# Patient Record
Sex: FEMALE | Race: WHITE
[De-identification: names, ages, dates, MRNs, and addresses within clinical notes are randomized per-mention and may not be internally consistent; named-entity substitution may affect disease eponyms.]

---

## 2017-05-18 ENCOUNTER — HOSPITAL ENCOUNTER (EMERGENCY)
Dept: HOSPITAL 80 - FED | Age: 82
Discharge: HOME | End: 2017-05-18
Payer: COMMERCIAL

## 2017-05-18 VITALS
TEMPERATURE: 98.6 F | HEART RATE: 60 BPM | DIASTOLIC BLOOD PRESSURE: 35 MMHG | RESPIRATION RATE: 16 BRPM | SYSTOLIC BLOOD PRESSURE: 115 MMHG

## 2017-05-18 VITALS — OXYGEN SATURATION: 93 %

## 2017-05-18 DIAGNOSIS — I25.10: ICD-10-CM
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
## 2017-05-18 DIAGNOSIS — I10: ICD-10-CM

## 2017-05-18 DIAGNOSIS — Z79.82: ICD-10-CM

## 2017-05-18 DIAGNOSIS — S00.83XA: Primary | ICD-10-CM

## 2017-05-18 DIAGNOSIS — I25.2: ICD-10-CM

## 2017-05-18 DIAGNOSIS — Z95.5: ICD-10-CM
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
## 2017-05-18 NOTE — EDPHY
H & P


Stated Complaint: walked into wall last night contusion r eye/on plavix


HPI/ROS: 





HPI





CHIEF COMPLAINT:  Head injury, on blood thinners





HISTORY OF PRESENT ILLNESS:  This patient very pleasant 86-year-old, 

significant past medical history for coronary artery disease with stent on 

aspirin and Plavix, anemia, dementia, presents emergency room with her daughter 

by private vehicle after she states that she was using her walker and turned 

and hit her head into the wall.  No LOC.  Did not fall.  Has localized 

ecchymosis and swelling to the right side of her face around her right orbit 

but no direct eye trauma.





Past Medical History:  Coronary artery disease, anemia, dementia, hypertension





Past Surgical History:  No recent surgical history, stents cardiac





Social History:  Lives locally, daughter at bedside





Family History:  Noncontributory








ROS   


REVIEW OF SYSTEMS:


A comprehensive 10 point review of systems is otherwise negative aside from 

elements mentioned in the history of present illness.








Exam   


Constitutional   triage nursing summary reviewed, vital signs reviewed, awake/

alert. 


Eyes   normal conjunctivae and sclera, EOMI, PERRLA.  Right eye normal.  No 

significant trauma to the globe or evidence of bleeding but periorbital 

swelling and ecchymosis present.


HENT   head/neck:  Ecchymosis and swelling periorbital right eye, right forehead

, no bony abnormality midface stable, no midline cervical spine pain, hematoma 

right forehead, no laceration normal inspection, atraumatic, moist mucus 

membranes, no epistaxis, neck supple/ no meningismus, no raccoon eyes. 


Respiratory   clear to auscultation bilaterally, normal breath sounds, no 

respiratory distress, no wheezing. 


Cardiovascular   rate normal, regular rhythm, no murmur, no edema, distal 

pulses normal. 


Gastrointestinal   soft, non-tender, no rebound, no guarding, normal bowel 

sounds, no distension, no pulsatile mass. 


Genitourinary   no CVA tenderness. 


Musculoskeletal  no midline vertebral tenderness, full range of motion, no calf 

swelling, no tenderness of extremities, no meningismus, good pulses, 

neurovascularly intact.


Skin   pink, warm, & dry, no rash, skin atraumatic. 


Neurologic   awake, alert and oriented x 3, AAOx3, moves all 4 extremities 

equally, motor intact, sensory intact, CN II-XII intact, normal cerebellar, 

normal vision, normal speech. 


Psychiatric   normal mood/affect. 


Heme/Lymph/Immune   no lymphadenopathy.





Differential Diagnosis:  Includes but not limited to in a particular order, 

closed-head injury, intracranial bleed, skull fracture, subdural, epidural, 

traumatic subarachnoid, facial fractures





Medical Decision Making:  Plan for this patient CT head without contrast for 

trauma given on aspirin and Plavix.  Aspirin has been on hold since last month 

due to anemia.  Still currently taking Plavix due to coronary stents.





Re-evaluation:








CT scan of the head without IV contrast The results of the study are negative 

for acute abnormality specifically no trauma..  The study was read by Dr. Hall.  I viewed the images myself on the PACS system.





1420:  I did update this patient family that the CT scan shows no acute bleed 

or trauma. They would like to be discharged home.  This is reasonable.  

Understand return emergency room if there is any worsening symptoms includes 

headache, vomiting or any questions or concerns.


Source: Patient





- Personal History


Current Tetanus/Diphtheria Vaccine: Yes


Tetanus Vaccine Date: >10 YRS





- Medical/Surgical History


Hx Asthma: No


Hx Chronic Respiratory Disease: No


Hx Diabetes: No


Hx Cardiac Disease: Yes


Hx Renal Disease: No


Hx Cirrhosis: No


Hx Alcoholism: No


Hx HIV/AIDS: No


Hx Splenectomy or Spleen Trauma: No


Other PMH: CAD, angioplasty '04, TIA '09, depression, hyperlipidemia, MI '85, 

stomach ulcer, lap cholle '04, total hysterectomy '94, appendectomy '60, benign 

lumpectomy L '67.  home o2.  depressiono, PE.  peritonitis.  c7 spinous process 

fx after fall 1/15.





- Social History


Smoking Status: Never smoked


Constitutional: 


 Initial Vital Signs











Temperature (C)  36.6 C   05/18/17 13:06


 


Heart Rate  76   05/18/17 13:06


 


Respiratory Rate  20   05/18/17 13:06


 


Blood Pressure  117/66   05/18/17 13:06


 


O2 Sat (%)  92   05/18/17 13:06








 











O2 Delivery Mode               Nasal Cannula


 


O2 (L/minute)                  2














Allergies/Adverse Reactions: 


 





Sulfa (Sulfonamide Antibiotics) Allergy (Verified 12/09/15 02:52)


 








Home Medications: 














 Medication  Instructions  Recorded


 


Clopidogrel Bisulfate [Plavix (*)] 75 mg PO DAILY 01/04/15


 


traZODone [traZODONE 50MG (*)] 25 mg PO HS PRN 01/04/15


 


Donepezil HCl [Aricept 5 MG (*)] 5 mg PO HS 01/05/15


 


amLODIPine BESYLATE [Norvasc 5 mg 5 mg PO DAILY #0 tab 01/07/15





(*)]  


 


Acetaminophen [Tylenol 325mg (*)] 325 - 650 mg PO Q6 PRN 12/09/15


 


Albuterol [Proventil Inhaler HFA 1 puffs IH BID 12/09/15





(*)]  


 


Atorvastatin Calcium [Lipitor 10 10 mg PO HS 12/09/15





mg (*)]  


 


Cholecalciferol Vit D3 [Vitamin D3 1,000 units PO DAILY 12/09/15





(*)]  


 


Cyanocobalamin [Vitamin B12 (*)] 1,000 mcg PO DAILY 12/09/15


 


DULoxetine [Cymbalta 30 MG (*)] 30 mg PO HS 12/09/15


 


Multivitamins [Multivitamin (*)] 1 tab PO DAILY 12/09/15


 


Oxybutynin Chloride [Ditropan Xl] 10 mg PO DAILY 12/09/15


 


Aspirin [Aspirin 325 mg (*)] 325 mg PO DAILY #0 tab 12/11/15


 


Metoprolol Tartrate [Lopressor 25 12.5 mg PO BID #60 tab 12/11/15





mg (*)]  


 


Ocuvite Lutein & Zeaxanthin Cp  12/17/16


 


Lisinopril  05/18/17














Medical Decision Making





- Diagnostics


Imaging Results: 


 Imaging Impressions





Head CT  05/18/17 13:39


Impression:


1. Moderate atrophy.


2. No acute hemorrhage, hydrocephalus, or mass effect.


3. Cerebrovascular atherosclerosis.


4. No definite acute infarct.


5. Moderate microvascular ischemic gliosis.


6. No epidural or subdural hematoma.


 


Findings and recommendations discussed with Emergency Department physician, 

Brandyn Oviedo M.D. at 1411 hours on May 18, 2017. 


 


Final report concurs with initial preliminary interpretation.














Departure





- Departure


Disposition: Home, Routine, Self-Care


Clinical Impression: 


Facial contusion


Qualifiers:


 Encounter type: initial encounter Qualified Code(s): S00.83XA - Contusion of 

other part of head, initial encounter





Condition: Good


Instructions:  Facial Contusion (ED)


Additional Instructions: 


1.Please return emergency room if you have any worsening symptoms includes 

severe headache, vomiting or questions or concerns.


Referrals: 


NONE *PRIMARY CARE P,. [Primary Care Provider] - As per Instructions

## 2017-09-08 ENCOUNTER — HOSPITAL ENCOUNTER (EMERGENCY)
Dept: HOSPITAL 80 - FED | Age: 82
Discharge: HOME | End: 2017-09-08
Payer: COMMERCIAL

## 2017-09-08 VITALS — TEMPERATURE: 96.8 F | RESPIRATION RATE: 18 BRPM

## 2017-09-08 VITALS — SYSTOLIC BLOOD PRESSURE: 123 MMHG | HEART RATE: 58 BPM | DIASTOLIC BLOOD PRESSURE: 74 MMHG | OXYGEN SATURATION: 93 %

## 2017-09-08 DIAGNOSIS — Z87.891: ICD-10-CM

## 2017-09-08 DIAGNOSIS — I25.2: ICD-10-CM

## 2017-09-08 DIAGNOSIS — Z79.82: ICD-10-CM

## 2017-09-08 DIAGNOSIS — I10: ICD-10-CM

## 2017-09-08 DIAGNOSIS — R07.9: Primary | ICD-10-CM

## 2017-09-08 DIAGNOSIS — I25.10: ICD-10-CM

## 2017-09-08 LAB
% IMMATURE GRANULYOCYTES: 0.2 % (ref 0–1.1)
ABSOLUTE IMMATURE GRANULOCYTES: 0.01 10^3/UL (ref 0–0.1)
ABSOLUTE NRBC COUNT: 0 10^3/UL (ref 0–0.01)
ADD DIFF?: NO
ADD MORPH?: NO
ADD SCAN?: NO
ANION GAP SERPL CALC-SCNC: 10 MEQ/L (ref 8–16)
ATYPICAL LYMPHOCYTE FLAG: 10 (ref 0–99)
CALCIUM SERPL-MCNC: 9.8 MG/DL (ref 8.5–10.4)
CHLORIDE SERPL-SCNC: 102 MEQ/L (ref 97–110)
CO2 SERPL-SCNC: 30 MEQ/L (ref 22–31)
CREAT SERPL-MCNC: 0.8 MG/DL (ref 0.6–1)
ERYTHROCYTE [DISTWIDTH] IN BLOOD BY AUTOMATED COUNT: 13.2 % (ref 11.5–15.2)
FRAGMENT RBC FLAG: 0 (ref 0–99)
GFR SERPL CREATININE-BSD FRML MDRD: > 60 ML/MIN/{1.73_M2}
GLUCOSE SERPL-MCNC: 95 MG/DL (ref 70–100)
HCT VFR BLD CALC: 37.4 % (ref 38–47)
HGB BLD-MCNC: 12.2 G/DL (ref 12.6–16.3)
LEFT SHIFT FLG: 0 (ref 0–99)
LIPEMIA HEMOLYSIS FLAG: 80 (ref 0–99)
MCH RBC BLDCO QN: 34.9 PG (ref 27.9–34.1)
MCHC RBC AUTO-ENTMCNC: 32.6 G/DL (ref 32.4–36.7)
MCV RBC AUTO: 106.9 FL (ref 81.5–99.8)
NRBC-AUTO%: 0 % (ref 0–0.2)
PLATELET # BLD: 191 10^3/UL (ref 150–400)
PLATELET CLUMPS FLAG: 0 (ref 0–99)
PMV BLD AUTO: 11.7 FL (ref 8.7–11.7)
POTASSIUM SERPL-SCNC: 4.3 MEQ/L (ref 3.5–5.2)
RBC # BLD AUTO: 3.5 10^6/UL (ref 4.18–5.33)
SODIUM SERPL-SCNC: 142 MEQ/L (ref 134–144)
TROPONIN I SERPL-MCNC: < 0.012 NG/ML (ref 0–0.03)

## 2017-09-08 NOTE — CPEKG
Heart Rate: 62

RR Interval: 968

P-R Interval: 188

QRSD Interval: 92

QT Interval: 436

QTC Interval: 443

P Axis: 49

QRS Axis: -43

T Wave Axis: -9

EKG Severity - ABNORMAL ECG -

EKG Impression: SINUS RHYTHM

EKG Impression: LEFT AXIS DEVIATION

EKG Impression: PROBABLE LEFT VENTRICULAR HYPERTROPHY

EKG Impression: ANTERIOR Q WAVES, POSSIBLY DUE TO LVH

EKG Impression: BORDERLINE T ABNORMALITIES, INFERIOR LEADS

Electronically Signed By: Katlin Medina 09-Sep-2017 06:20:14

## 2017-09-08 NOTE — EDPHY
H & P


Stated Complaint: AWOKE FROM SLEEP WITH CHEST PAIN "LIKE SOMEONE PUNCHED ME"


Time Seen by Provider: 09/08/17 02:55


HPI/ROS: 





HPI


The patient presents brought in by ambulance for chest pain which feels like an 

achy sensation in her mid chest and does not radiate.  It began when she awoke 

from sleep tonight at approximately midnight.  The pain has now subsided after 

receiving aspirin and nitroglycerin from the paramedics.  She does not have any 

shortness of breath, nausea, vomiting, dizziness, diaphoresis.  With her prior 

MI she always had arm pain and denies any arm pain currently.  She has not had 

any falls recently.  She denies any leg swelling.





REVIEW OF SYSTEMS


Constitutional:  No fever, no chills.


Eyes:  No discharge.


ENT:  No sore throat.


Cardiovascular:  Positive for chest pain, no palpitations.


Respiratory:  No cough, no shortness of breath.


Gastrointestinal:  No abdominal pain, no vomiting.


Genitourinary:  No hematuria.


Musculoskeletal:  No back pain.


Skin:  No rashes.


Neurological:  No headache.





PMHx:  CAD, hypertension, on home oxygen





Soc Hx: resides at the [facility], here with her daughter








PHYSICAL


General Appearance: Alert, no distress


Eyes: Pupils equal and round no pallor or injection


ENT, Mouth: Mucous membranes moist


Respiratory: There are no retractions, lungs are clear to auscultation


Cardiovascular:  Regular rate and rhythm 


Gastrointestinal:  Abdomen is soft and non-tender, no masses, bowel sounds 

normal 


Neurological:  A&O, moves all extremities


Skin:  Warm and dry, no rashes


Musculoskeletal: Neck is supple non tender 


Extremities:  symmetrical, full range of motion 


Psychiatric:  Patient is oriented X 3, there is no agitation 





Source: Patient, EMS


Exam Limitations: No limitations





- Personal History


Tetanus Vaccine Date: >10 YRS





- Medical/Surgical History


Hx Asthma: No


Hx Chronic Respiratory Disease: No


Hx Diabetes: No


Hx Cardiac Disease: Yes


Hx Renal Disease: No


Hx Cirrhosis: No


Hx Alcoholism: No


Hx HIV/AIDS: No


Hx Splenectomy or Spleen Trauma: No


Other PMH: CAD, angioplasty '04, TIA '09, depression, hyperlipidemia, MI '85, 

stomach ulcer, lap cholle '04, total hysterectomy '94, appendectomy '60, benign 

lumpectomy L '67.  home o2, htn, dementia.  depressiono, PE.  peritonitis.  c7 

spinous process fx after fall 1/15.





- Social History


Smoking Status: Former smoker


Constitutional: 


 Initial Vital Signs











Temperature (C)  36.0 C   09/08/17 02:38


 


Heart Rate  70   09/08/17 02:38


 


Respiratory Rate  18   09/08/17 02:38


 


Blood Pressure  119/68   09/08/17 02:38


 


O2 Sat (%)  92   09/08/17 02:38








 











O2 Delivery Mode               Nasal Cannula


 


O2 (L/minute)                  4














Allergies/Adverse Reactions: 


 





Sulfa (Sulfonamide Antibiotics) Allergy (Verified 09/08/17 03:25)


 








Home Medications: 














 Medication  Instructions  Recorded


 


Clopidogrel Bisulfate [Plavix (*)] 75 mg PO DAILY 01/04/15


 


traZODone [traZODONE 50MG (*)] 25 mg PO HS PRN 01/04/15


 


Donepezil HCl [Aricept 5 MG (*)] 5 mg PO HS 01/05/15


 


amLODIPine BESYLATE [Norvasc 5 mg 5 mg PO DAILY #0 tab 01/07/15





(*)]  


 


Acetaminophen [Tylenol 325mg (*)] 325 - 650 mg PO Q6 PRN 12/09/15


 


Albuterol [Proventil Inhaler HFA 1 puffs IH BID 12/09/15





(*)]  


 


Atorvastatin Calcium [Lipitor 10 10 mg PO HS 12/09/15





mg (*)]  


 


Cholecalciferol Vit D3 [Vitamin D3 1,000 units PO DAILY 12/09/15





(*)]  


 


Cyanocobalamin [Vitamin B12 (*)] 1,000 mcg PO DAILY 12/09/15


 


DULoxetine [Cymbalta 30 MG (*)] 30 mg PO HS 12/09/15


 


Multivitamins [Multivitamin (*)] 1 tab PO DAILY 12/09/15


 


Oxybutynin Chloride [Ditropan Xl] 10 mg PO DAILY 12/09/15


 


Aspirin [Aspirin 325 mg (*)] 325 mg PO DAILY #0 tab 12/11/15


 


Metoprolol Tartrate [Lopressor 25 12.5 mg PO BID #60 tab 12/11/15





mg (*)]  


 


Ocuvite Lutein & Zeaxanthin Cp  12/17/16


 


Lisinopril  05/18/17


 


BENADRYL  09/08/17


 


Protonix  09/08/17


 


Zantac  09/08/17














Medical Decision Making





- Diagnostics


EKG Interpretation: 





EKG:  Complete interpretation has been separately recorded in the [facility] 

archive.  Summary impression:  No ST segment elevation, unchanged from prior 

EKG dated December 2016.





Imaging Results: 


Chest x-ray two views demonstrates mild cardiomegaly with possible scarring at 

the left lung, there is no effusion.  Interpreted by me, radiology 

interpretation is pending.


Imaging: I viewed and interpreted images myself


Differential Diagnosis: 





This is an 86-year-old female with multiple medical problems including CAD who 

presents from home brought in by ambulance with chest pain which occurred while 

at rest lying in bed tonight.  The pain is no associated features.  On exam, 

she does have mild chest wall tenderness in the left chest, otherwise has a 

normal physical exam.





Differential diagnosis includes ACS, costochondritis, GERD, zoster, PE.





In the emergency department, the patient's pain did not recur.  She felt well.  

Initial labs including a troponin are unremarkable.  EKG is unchanged from 

prior.  Chest x-ray shows no concerning findings.  She was monitored in the 

emergency room for several hours.  Repeat troponin was performed and continued 

to be unremarkable.  She requests to go home and I feel this is reasonable.  I 

have advised her to follow up with her cardiologist.  I suspect her pain is due 

to costochondritis or is musculoskeletal.





- Data Points


Laboratory Results: 


 Laboratory Results





 09/08/17 02:42 





 09/08/17 02:42 





 











  09/08/17 09/08/17 09/08/17





  04:45 02:42 02:42


 


WBC      5.99 10^3/uL 10^3/uL





     (3.80-9.50) 


 


RBC      3.50 10^6/uL L 10^6/uL





     (4.18-5.33) 


 


Hgb      12.2 g/dL L g/dL





     (12.6-16.3) 


 


Hct      37.4 % L %





     (38.0-47.0) 


 


MCV      106.9 fL H fL





     (81.5-99.8) 


 


MCH      34.9 pg H pg





     (27.9-34.1) 


 


MCHC      32.6 g/dL g/dL





     (32.4-36.7) 


 


RDW      13.2 % %





     (11.5-15.2) 


 


Plt Count      191 10^3/uL 10^3/uL





     (150-400) 


 


MPV      11.7 fL fL





     (8.7-11.7) 


 


Neut % (Auto)      49.7 % %





     (39.3-74.2) 


 


Lymph % (Auto)      33.2 % %





     (15.0-45.0) 


 


Mono % (Auto)      10.5 % %





     (4.5-13.0) 


 


Eos % (Auto)      5.7 % %





     (0.6-7.6) 


 


Baso % (Auto)      0.7 % %





     (0.3-1.7) 


 


Nucleat RBC Rel Count      0.0 % %





     (0.0-0.2) 


 


Absolute Neuts (auto)      2.98 10^3/uL 10^3/uL





     (1.70-6.50) 


 


Absolute Lymphs (auto)      1.99 10^3/uL 10^3/uL





     (1.00-3.00) 


 


Absolute Monos (auto)      0.63 10^3/uL 10^3/uL





     (0.30-0.80) 


 


Absolute Eos (auto)      0.34 10^3/uL 10^3/uL





     (0.03-0.40) 


 


Absolute Basos (auto)      0.04 10^3/uL 10^3/uL





     (0.02-0.10) 


 


Absolute Nucleated RBC      0.00 10^3/uL 10^3/uL





     (0-0.01) 


 


Immature Gran %      0.2 % %





     (0.0-1.1) 


 


Immature Gran #      0.01 10^3/uL 10^3/uL





     (0.00-0.10) 


 


Sodium    142 mEq/L mEq/L  





    (134-144)  


 


Potassium    4.3 mEq/L mEq/L  





    (3.5-5.2)  


 


Chloride    102 mEq/L mEq/L  





    ()  


 


Carbon Dioxide    30 mEq/l mEq/l  





    (22-31)  


 


Anion Gap    10 mEq/L mEq/L  





    (8-16)  


 


BUN    25 mg/dL H mg/dL  





    (7-23)  


 


Creatinine    0.8 mg/dL mg/dL  





    (0.6-1.0)  


 


Estimated GFR    > 60   





    


 


Glucose    95 mg/dL mg/dL  





    ()  


 


Calcium    9.8 mg/dL mg/dL  





    (8.5-10.4)  


 


Troponin I  < 0.012 ng/mL ng/mL  < 0.012 ng/mL ng/mL  





   (0.000-0.034)   (0.000-0.034)  














Departure





- Departure


Disposition: Home, Routine, Self-Care


Clinical Impression: 


Chest pain


Qualifiers:


 Chest pain type: unspecified Qualified Code(s): R07.9 - Chest pain, unspecified





Condition: Good


Instructions:  Chest Pain (ED)


Additional Instructions: 


Your testing today looked normal.  Please follow-up with your regular doctor in 

the next 1-2 days.  Please return to the emergency room immediately if you have 

any more pain in her chest.


Referrals: 


Linda Jordan MD [Primary Care Provider] - As per Instructions

## 2017-10-19 ENCOUNTER — HOSPITAL ENCOUNTER (OUTPATIENT)
Dept: HOSPITAL 80 - FIMAGING | Age: 82
End: 2017-10-19
Attending: FAMILY MEDICINE
Payer: COMMERCIAL

## 2017-10-19 DIAGNOSIS — R91.8: Primary | ICD-10-CM

## 2018-03-13 ENCOUNTER — HOSPITAL ENCOUNTER (EMERGENCY)
Dept: HOSPITAL 80 - FED | Age: 83
Discharge: HOME | End: 2018-03-13
Payer: COMMERCIAL

## 2018-03-13 VITALS — RESPIRATION RATE: 13 BRPM

## 2018-03-13 VITALS
OXYGEN SATURATION: 95 % | HEART RATE: 61 BPM | DIASTOLIC BLOOD PRESSURE: 86 MMHG | TEMPERATURE: 97.7 F | SYSTOLIC BLOOD PRESSURE: 171 MMHG

## 2018-03-13 DIAGNOSIS — I10: ICD-10-CM

## 2018-03-13 DIAGNOSIS — I25.10: ICD-10-CM

## 2018-03-13 DIAGNOSIS — B96.89: ICD-10-CM

## 2018-03-13 DIAGNOSIS — E86.9: ICD-10-CM

## 2018-03-13 DIAGNOSIS — Z87.891: ICD-10-CM

## 2018-03-13 DIAGNOSIS — Z79.82: ICD-10-CM

## 2018-03-13 DIAGNOSIS — N39.0: ICD-10-CM

## 2018-03-13 DIAGNOSIS — R51: Primary | ICD-10-CM

## 2018-03-13 DIAGNOSIS — R29.898: ICD-10-CM

## 2018-03-13 LAB — PLATELET # BLD: 180 10^3/UL (ref 150–400)

## 2018-03-13 NOTE — EDPHY
H & P


Stated Complaint: Headache, leg weakness


Source: Patient, EMS


Exam Limitations: Physical impairment





- Personal History


Tetanus Vaccine Date: >10 YRS





- Medical/Surgical History


Hx Asthma: No


Hx Chronic Respiratory Disease: No


Hx Diabetes: No


Hx Cardiac Disease: Yes


Hx Renal Disease: No


Hx Cirrhosis: No


Hx Alcoholism: No


Hx HIV/AIDS: No


Hx Splenectomy or Spleen Trauma: No


Other PMH: CAD, angioplasty '04, TIA '09, depression, hyperlipidemia, MI '85, 

stomach ulcer, lap cholle '04, total hysterectomy '94, appendectomy '60, benign 

lumpectomy L '67.  home o2, htn, dementia.  depressiono, PE.  peritonitis.  c7 

spinous process fx after fall 1/15.





- Social History


Smoking Status: Former smoker


Time Seen by Provider: 03/13/18 05:55


HPI/ROS: 





HPI


The patient presents brought in by ambulance from her assisted living facility 

the [facility] for left leg weakness and headache.  The patient awoke this 

morning and noticed that she had a left-sided occipital headache.  She had felt 

this earlier in the day yesterday though it was mild.  She says it feels as if 

she had been hit in the head.  The pain is mild in nature.  She then got up out 

of her bed to turn up her oxygen to see if this would help her to feel better.  

She was able to stand but noticed that her left leg gave out on her and was 

feeling weak.  She was able to put weight on it but decided to lie back in bed.

  She did not fall.  She said she also felt slightly woozy when she stood up.  

She decided to call for an ambulance.





REVIEW OF SYSTEMS


Constitutional:  No fever, no chills.


Eyes:  No discharge.


ENT:  No sore throat.


Cardiovascular:  No chest pain, no palpitations.


Respiratory:  No cough, no shortness of breath.


Gastrointestinal:  No abdominal pain, no vomiting.


Genitourinary:  No hematuria.


Musculoskeletal:  No back pain.


Skin:  No rashes.


Neurological:  Positive for headache.





PMHx:  CAD, hypertension, on oxygen at baseline, dementia





Soc Hx:  Resides at the [facility], [place], has family in [facility]








PHYSICAL


General Appearance: Alert, no distress


Eyes: Pupils equal and round no pallor or injection


ENT, Mouth: Mucous membranes moist


Respiratory: There are no retractions, lungs are clear to auscultation


Cardiovascular:  Regular rate and rhythm 


Gastrointestinal:  Abdomen is soft and non-tender, no masses, bowel sounds 

normal 


Neurological:  A&O, cranial nerves 2-12 intact, 5/5 strength in upper and lower 

extremities which is symmetric


Skin:  Warm and dry, no rashes


Musculoskeletal: Neck is supple non tender 


Extremities:  symmetrical, full range of motion 


Psychiatric:  Patient is oriented , there is no agitation 


 (Katlin Medina)


Constitutional: 


 Initial Vital Signs











Temperature (C)  36.7 C   03/13/18 06:07


 


Heart Rate  61   03/13/18 06:07


 


Respiratory Rate  18   03/13/18 06:07


 


Blood Pressure  176/90 H  03/13/18 06:07


 


O2 Sat (%)  93   03/13/18 06:07








 











O2 Delivery Mode               Nasal Cannula


 


O2 (L/minute)                  2














Allergies/Adverse Reactions: 


 





Sulfa (Sulfonamide Antibiotics) Allergy (Verified 03/13/18 06:01)


 








Home Medications: 














 Medication  Instructions  Recorded


 


Clopidogrel Bisulfate [Plavix (*)] 75 mg PO DAILY 01/04/15


 


traZODone [traZODONE 50MG (*)] 25 mg PO HS PRN 01/04/15


 


Donepezil HCl [Aricept 5 MG (*)] 5 mg PO HS 01/05/15


 


amLODIPine BESYLATE [Norvasc 5 mg 5 mg PO DAILY #0 tab 01/07/15





(*)]  


 


Acetaminophen [Tylenol 325mg (*)] 325 - 650 mg PO Q6 PRN 12/09/15


 


Albuterol [Proventil Inhaler HFA 1 puffs IH BID 12/09/15





(*)]  


 


Atorvastatin Calcium [Lipitor 10 10 mg PO HS 12/09/15





mg (*)]  


 


Cholecalciferol Vit D3 [Vitamin D3 1,000 units PO DAILY 12/09/15





(*)]  


 


Cyanocobalamin [Vitamin B12 (*)] 1,000 mcg PO DAILY 12/09/15


 


DULoxetine [Cymbalta 30 MG (*)] 30 mg PO HS 12/09/15


 


Multivitamins [Multivitamin (*)] 1 tab PO DAILY 12/09/15


 


Oxybutynin Chloride [Ditropan Xl] 10 mg PO DAILY 12/09/15


 


Aspirin [Aspirin 325 mg (*)] 325 mg PO DAILY #0 tab 12/11/15


 


Metoprolol Tartrate [Lopressor 25 12.5 mg PO BID #60 tab 12/11/15





mg (*)]  


 


Ocuvite Lutein & Zeaxanthin Cp  12/17/16


 


Lisinopril  05/18/17


 


BENADRYL  09/08/17


 


Protonix  09/08/17


 


Zantac  09/08/17


 


Cephalexin [Keflex (RX)] 500 mg PO TID #30 cap 03/13/18














Medical Decision Making





- Diagnostics


Imaging: I viewed and interpreted images myself





- Diagnostics


EKG Interpretation: 





EKG:  Complete interpretation has been separately recorded in the [facility] 

archive.  Summary impression:  Normal sinus rhythm with left axis deviation


 (Katlin Medina)


Imaging Results: 


 Imaging Impressions





Chest X-Ray  03/13/18 05:56


Impression: Stable chest. Spiculated nodule right upper lobe which is for 

malignant etiology. No focal acute infiltrate or pleural effusion.


 


Results called to Dr. Dwayne Matthews at 7:40 AM.








Head CT  03/13/18 06:07


Impression:   Stable noncontrast CT examination of the brain 


 


Results called to Dr. Dwayne Matthews at 7:22 AM at the time of the interpretation.








Chest x-ray one view shows no infiltrate, no cardiomegaly, interpreted by me, 

radiology interpretation is pending. (Katlin Medina)


Differential Diagnosis: 





This is an 87-year-old female with past medical history of CAD, hypertension, 

dementia who presents brought in by ambulance from her assisted living facility 

for a headache and some weakness of her left leg which is now seems to have 

resolved.  She awoke with the headache and then stood, she felt that her leg 

was going to give out on her though it did not.  Paramedics did not notice any 

deficits in her strength.  Here, she is intact with full strength of her leg 

and sensation intact to light touch.  She does not have any leg swelling or 

edema or skin changes.  She has a normal neurologic exam.





Differential diagnosis includes intracranial hemorrhage, CVA, electrolyte 

disturbance, urinary tract infection.








Pt given 1L IVF in the case of dehydration. CBC and chem panel are normal 

except for slightly elevated BUN. She has a normal CXR and EKG. 





At change of shift at 7am, Dr Matthews assumed care of the patient. We are 

awaiting CT head and UA results at this time. If these are normal, I believe 

she can be safely discharged home. Given the vague nature of her complaints, 

and quick resolution, I feel TIA is probably unlikely. 





 (Katlin Medina)


Other Provider: 


0700: Assumed care of this patient at shift change from Dr. Medina pending 

head CT and lab work. 





0722: Head CT shows atrophy, nothing acute.





0743: Chest x-ray: continuing enlargement of spiculated nodule of the lung. 





0810: Spoke with patient's son, who is now at bedside and is MDPOA. Updated him 

on patient condition. He plans to take her home once work up is complete. UA 

pending.  (Dwayne Matthews)





- Data Points


Laboratory Results: 


 Laboratory Results





 03/13/18 06:04 





 03/13/18 06:04 





 











  03/13/18 03/13/18 03/13/18





  07:38 06:04 06:04


 


WBC      5.24 10^3/uL 10^3/uL





     (3.80-9.50) 


 


RBC      3.26 10^6/uL L 10^6/uL





     (4.18-5.33) 


 


Hgb      11.5 g/dL L g/dL





     (12.6-16.3) 


 


Hct      35.7 % L %





     (38.0-47.0) 


 


MCV      109.5 fL H fL





     (81.5-99.8) 


 


MCH      35.3 pg H pg





     (27.9-34.1) 


 


MCHC      32.2 g/dL L g/dL





     (32.4-36.7) 


 


RDW      13.1 % %





     (11.5-15.2) 


 


Plt Count      180 10^3/uL 10^3/uL





     (150-400) 


 


MPV      11.2 fL fL





     (8.7-11.7) 


 


Neut % (Auto)      59.2 % %





     (39.3-74.2) 


 


Lymph % (Auto)      23.3 % %





     (15.0-45.0) 


 


Mono % (Auto)      11.5 % %





     (4.5-13.0) 


 


Eos % (Auto)      4.6 % %





     (0.6-7.6) 


 


Baso % (Auto)      0.8 % %





     (0.3-1.7) 


 


Nucleat RBC Rel Count      0.0 % %





     (0.0-0.2) 


 


Absolute Neuts (auto)      3.11 10^3/uL 10^3/uL





     (1.70-6.50) 


 


Absolute Lymphs (auto)      1.22 10^3/uL 10^3/uL





     (1.00-3.00) 


 


Absolute Monos (auto)      0.60 10^3/uL 10^3/uL





     (0.30-0.80) 


 


Absolute Eos (auto)      0.24 10^3/uL 10^3/uL





     (0.03-0.40) 


 


Absolute Basos (auto)      0.04 10^3/uL 10^3/uL





     (0.02-0.10) 


 


Absolute Nucleated RBC      0.00 10^3/uL 10^3/uL





     (0-0.01) 


 


Immature Gran %      0.6 % %





     (0.0-1.1) 


 


Immature Gran #      0.03 10^3/uL 10^3/uL





     (0.00-0.10) 


 


Sodium    146 mEq/L H mEq/L  





    (135-145)  


 


Potassium    4.6 mEq/L mEq/L  





    (3.5-5.2)  


 


Chloride    105 mEq/L mEq/L  





    ()  


 


Carbon Dioxide    31 mEq/l mEq/l  





    (22-31)  


 


Anion Gap    10 mEq/L mEq/L  





    (8-16)  


 


BUN    30 mg/dL H mg/dL  





    (7-23)  


 


Creatinine    0.7 mg/dL mg/dL  





    (0.6-1.0)  


 


Estimated GFR    > 60   





    


 


Glucose    92 mg/dL mg/dL  





    ()  


 


Calcium    9.4 mg/dL mg/dL  





    (8.5-10.4)  


 


Troponin I    < 0.012 ng/mL ng/mL  





    (0.000-0.034)  


 


Urine Color  YELLOW     





    


 


Urine Appearance  HAZY     





    


 


Urine pH  6.0     





   (5.0-7.5)   


 


Ur Specific Gravity  1.018     





   (1.002-1.030)   


 


Urine Protein  NEGATIVE     





   (NEGATIVE)   


 


Urine Ketones  NEGATIVE     





   (NEGATIVE)   


 


Urine Blood  NEGATIVE     





   (NEGATIVE)   


 


Urine Nitrate  POSITIVE  H     





   (NEGATIVE)   


 


Urine Bilirubin  NEGATIVE     





   (NEGATIVE)   


 


Urine Urobilinogen  NEGATIVE EU EU    





   (0.2-1.0)   


 


Ur Leukocyte Esterase  2+  H     





   (NEGATIVE)   


 


Urine RBC  1-3 /hpf /hpf    





   (0-3)   


 


Urine WBC  15-25 /hpf H /hpf    





   (0-3)   


 


Ur Epithelial Cells  TRACE /lpf /lpf    





   (NONE-1+)   


 


Urine Bacteria  2+ /hpf H /hpf    





   (NONE SEEN)   


 


Urine Mucus  TRACE /lpf /lpf    





   (NONE-1+)   


 


Urine Glucose  NEGATIVE     





   (NEGATIVE)   











Medications Given: 


 








Discontinued Medications





Sodium Chloride (Ns)  1,000 mls @ 500 mls/hr IV EDNOW ONE


   PRN Reason: Protocol


   Stop: 03/13/18 07:54


   Last Admin: 03/13/18 06:13 Dose:  1,000 mls








Departure





- Departure


Disposition: Home, Routine, Self-Care


Clinical Impression: 


Headache


Qualifiers:


 Headache type: unspecified Headache chronicity pattern: acute headache 

Intractability: not intractable Qualified Code(s): R51 - Headache





Leg weakness


Qualifiers:


 Laterality: left Qualified Code(s): R29.898 - Other symptoms and signs 

involving the musculoskeletal system





UTI (urinary tract infection)


Qualifiers:


 Urinary tract infection type: site unspecified Hematuria presence: with 

hematuria Qualified Code(s): N39.0 - Urinary tract infection, site not specified

; R31.9 - Hematuria, unspecified; R31.9 - Hematuria, unspecified





Condition: Good


Instructions:  Acute Headache (ED), Urinary Tract Infection in Women (ED), 

Cephalexin (By mouth)


Additional Instructions: 


Take Keflex as prescribed for UTI. Be sure to complete the entire prescription.





Tylenol as directed on the packaging if needed for pain or fever. 





Please follow up with your primary care doctor in 1-2 days. 





Follow up with your primary care provider regarding lung nodule seen on x-ray.


Referrals: 


Patient,NotPresent [Unknown] - As per Instructions


Marla Marquez MD [[facility] Primary Care Provider] - As per Instructions


Prescriptions: 


Cephalexin [Keflex (RX)] 500 mg PO TID #30 cap

## 2018-03-13 NOTE — CPEKG
Heart Rate: 53

RR Interval: 1132

P-R Interval: 204

QRSD Interval: 90

QT Interval: 444

QTC Interval: 417

P Axis: 47

QRS Axis: -38

T Wave Axis: -12

EKG Severity - ABNORMAL ECG -

EKG Impression: SINUS RHYTHM

EKG Impression: LEFT AXIS DEVIATION

EKG Impression: LEFT VENTRICULAR HYPERTROPHY

EKG Impression: ANTERIOR Q WAVES, POSSIBLY DUE TO LVH

EKG Impression: BORDERLINE T ABNORMALITIES, INFERIOR LEADS

Electronically Signed By: Katlin Medina 13-Mar-2018 07:18:15

## 2018-05-05 ENCOUNTER — HOSPITAL ENCOUNTER (OUTPATIENT)
Dept: HOSPITAL 80 - FED | Age: 83
Setting detail: OBSERVATION
Discharge: HOME HEALTH | End: 2018-05-05
Attending: FAMILY MEDICINE | Admitting: FAMILY MEDICINE
Payer: COMMERCIAL

## 2018-05-05 VITALS — SYSTOLIC BLOOD PRESSURE: 157 MMHG | DIASTOLIC BLOOD PRESSURE: 71 MMHG

## 2018-05-05 DIAGNOSIS — Z87.891: ICD-10-CM

## 2018-05-05 DIAGNOSIS — I20.9: Primary | ICD-10-CM

## 2018-05-05 DIAGNOSIS — J96.90: ICD-10-CM

## 2018-05-05 DIAGNOSIS — R25.2: ICD-10-CM

## 2018-05-05 DIAGNOSIS — R91.8: ICD-10-CM

## 2018-05-05 DIAGNOSIS — Z99.81: ICD-10-CM

## 2018-05-05 DIAGNOSIS — F03.90: ICD-10-CM

## 2018-05-05 LAB
CK SERPL-CCNC: 40 IU/L (ref 0–156)
INR PPP: 0.93 (ref 0.83–1.16)
PLATELET # BLD: 185 10^3/UL (ref 150–400)
PROTHROMBIN TIME: 12.7 SEC (ref 12–15)

## 2018-05-05 PROCEDURE — 71045 X-RAY EXAM CHEST 1 VIEW: CPT

## 2018-05-05 PROCEDURE — 93005 ELECTROCARDIOGRAM TRACING: CPT

## 2018-05-05 PROCEDURE — G0378 HOSPITAL OBSERVATION PER HR: HCPCS

## 2018-05-05 NOTE — GDS
[f rep st]



                                                             DISCHARGE SUMMARY





DISCHARGE DIAGNOSIS:  

1.  Angina, medical management only.

2.  Advanced dementia.

3.  Lung mass.  Workup declined.

4.  Coronary artery disease, status post previous stents.

5.  Leg cramps.

6.  Chronic respiratory failure on chronic oxygen.



HISTORY:  Ms. Tay is an 87-year-old female with a history of coronary artery disease, status post pr
evious stents. She continues to have angina with chest pain occurring monthly.  She has family and catracho johnson has discussed with Dr. Myles, and given her advanced dementia, the decision is made not to pur
josias any further cardiac catheterization and she is on medical management only.  She now re-presents t
o the hospital with recurrence of chest pain although this episode sounds like it started with leg cr
amping that radiated up towards her chest was quite atypical in nature.  Despite this, the family is 
interested in more aggressive management of these chest pain episodes so that she does not return to 
the hospital with each pain. She is not on any nitrates which I will initiate. Her chest pain episode
s are usually at night so will start Imdur 30 mg p.o. at bedtime. If she continues to have chest pain
 despite addition of nitrates, could discuss with Dr. Myles initiation of Ranexa. Will also order a 
palliative care consultation with home health to arrange an improved outpatient situation where recur
rent hospitalizations due not occur.



DISCHARGE MEDICATIONS:  Please see computerized record for full detailed list.  



New medications:

1.  Imdur 30 mg p.o. at bedtime.

2.  Nitroglycerin 0.4 mg sublingual q.5 hours minutes as needed.



ADDITIONAL DISCHARGE INSTRUCTIONS:  

1.  Outpatient palliative care for management of angina with future interventions being planned.

2.  Follow up with primary care, Linda Jordan.

3.   

Patient was seen and examined by me on the day of discharge.  Greater 30 minutes' time was spent arra
nging this discharge.





Job #:  [id number]

## 2018-05-05 NOTE — PDIAF
- Diagnosis


Diagnosis: angina - med management only


Code Status: Do Not Resuscitate





- Medication Management


Discharge Medications: 


 Medications to Continue on Transfer





Acetaminophen [Tylenol 325mg (*)] 325 - 650 mg PO Q6HRS PRN 05/05/18 [Last 

Taken Unknown]


Albuterol [Proventil Inhaler HFA (*)] 1 puffs IH BID 05/05/18 [Last Taken 

Unknown]


Atorvastatin Calcium [Lipitor 20 mg (*)] 20 mg PO HS 05/05/18 [Last Taken 

Unknown]


Cholecalciferol Vit D3 [Vitamin D3 (*)] 1,000 units PO DAILY 05/05/18 [Last 

Taken Unknown]


Clopidogrel Bisulfate [Plavix (*)] 75 mg PO DAILY 05/05/18 [Last Taken Unknown]


Cyanocobalamin (Vitamin B-12) [B-12] 1,000 mcg PO DAILY 05/05/18 [Last Taken 

Unknown]


DULoxetine [Cymbalta 60 MG (*)] 60 mg PO DAILY 05/05/18 [Last Taken Unknown]


Donepezil HCl [Aricept] 10 mg PO DAILY 05/05/18 [Last Taken Unknown]


Ferrous Sulfate [Ferrous Sulf 325 MG (*)] 1 tab PO HS 05/05/18 [Last Taken 

Unknown]


Herbals/Supplements -Info Only 1 ea PO DAILY 05/05/18 [Last Taken Unknown]


Isosorbide Mononitrate [Imdur 30 mg (*)] 30 mg PO HS #30 tab.sr 05/05/18 [Last 

Taken Unknown]


Metoprolol Succinate Xr [Toprol Xl 25 mg (*)] 12.5 mg PO DAILY 05/05/18 [Last 

Taken Unknown]


Mirabegron [Myrbetriq] 50 mg PO DAILY 05/05/18 [Last Taken Unknown]


Multivitamins [Multivitamin (*)] 1 each PO DAILY 05/05/18 [Last Taken Unknown]


Nitroglycerin [Nitrostat 0.4 mg (*)] 0.4 mg SL Q5M PRN #10 btl 05/05/18 [Last 

Taken Unknown]


Pantoprazole Sodium [Protonix 40mg (*)] 40 mg PO HS 05/05/18 [Last Taken Unknown

]


Vit C/E/Zn/Coppr/Lutein/Zeaxan [Ocuvite Lutein & Zeaxanthin Cp] 1 cap PO BID 05/ 05/18 [Last Taken Unknown]


traZODone [traZODONE 50MG (*)] 50 mg PO HS PRN 05/05/18 [Last Taken Unknown]








Discharge Medications: Refer to the Discharge Home Medication list for PRN 

reason.





- Orders


Services needed: Home Care, Registered Nurse, Physical Therapy, Occupational 

Therapy


Home Care Face to Face: I certify that this patient was under my care and that 

I had the required face-to-face encounter meeting the encounter requirements on 

the discharge day.  My findings support the fact that the patient is homebound 

as defined in


Home Care Face to Face Continued: CMS Chapter 7 Medicare Benefits Manual 30.1.1

, The condition of the patient is such that there exists a normal inability to 

leave home and consequently, leaving home would require a considerable and 

taxing effort.


Isolation Type: None


Diet Recommendation: no restrictions on diet


Additional Instructions: 


Outpatient palliative care





If continues to have angina while on nitrates, consider initiate Ranexa with 

Dr. Myles














- Follow Up Care


Current Providers and Referrals: 


Patient,NotPresent [Primary Care Provider] - As per Instructions


Linda Jordan MD [Doctor of Osteopathy] - 


Bowen Myles MD [Medical Doctor] -

## 2018-05-05 NOTE — PDGENHP
History and Physical





- Chief Complaint


Leg cramping, chest discomfort





- History of Present Illness








Source-patient is fair historian she does have history of dementia but overall 

is able to provide majority of the history.  No family is currently at bedside.

  On patient lives alone independent living.  The case was discussed with ED 

provider in EMR was reviewed.





HPI-this is a very pleasant 87-year-old female with past medical history 

significant for CAD status post stenting, HTN, HLD, O2 dependence, depression, 

dementia who presents emergency department today via EMS with complaints of 

lower extremity leg cramping.  Patient reports she was trying to get to bed.  

She has been experiencing lower extremity cramping but today was increasingly 

severe bilaterally.  Patient denies any swelling or calf pain.  Her pain 

started to radiate upper leg into who her belly and up through her chest.  She 

reports that she was on having some palpitations she denies any depression 

chest pressure, diaphoresis, nausea vomiting, shortness of breath above 

baseline.  She was wearing her oxygen which she does continuously.  Patient 

reports that the chest pain radiated upward from her legs and then was 

subsequently gone.  EMS reported to the ED provider that patient was 

complaining of some chest pressure.  She received a dose of aspirin prior to 

arrival in her symptoms completely resolved.  Currently patient is denying any 

symptoms she is feeling tired and would like to go to sleep.





History Information





- Allergies/Home Medication List


Allergies/Adverse Reactions: 








Sulfa (Sulfonamide Antibiotics) Allergy (Verified 18 06:01)


 





Home Medications: 








Clopidogrel Bisulfate [Plavix (*)] 75 mg PO DAILY 01/04/15 [Last Taken 12/08/15 

09:00]


traZODone [traZODONE 50MG (*)] 25 mg PO HS PRN 01/04/15 [Last Taken 12/08/15 21:

00]


Donepezil HCl [Aricept 5 MG (*)] 5 mg PO HS 01/05/15 [Last Taken 12/08/15 21:00]


Acetaminophen [Tylenol 325mg (*)] 325 - 650 mg PO Q6 PRN 12/09/15 [Last Taken 

Unknown]


Albuterol [Proventil Inhaler HFA (*)] 1 puffs IH BID 12/09/15 [Last Taken 12/08/

15 09:00]


Atorvastatin Calcium [Lipitor 10 mg (*)] 10 mg PO HS 12/09/15 [Last Taken 12/08/

15 21:00]


Cholecalciferol Vit D3 [Vitamin D3 (*)] 1,000 units PO DAILY 12/09/15 [Last 

Taken 12/08/15 09:00]


Cyanocobalamin [Vitamin B12 (*)] 1,000 mcg PO DAILY 12/09/15 [Last Taken 12/08/

15 09:00]


DULoxetine [Cymbalta 30 MG (*)] 30 mg PO HS 12/09/15 [Last Taken 12/08/15 21:00]


Multivitamins [Multivitamin (*)] 1 tab PO DAILY 12/09/15 [Last Taken 12/08/15 09

:00]


Ocuvite Lutein & Zeaxanthin Cp  16 [Last Taken Unknown]


Lisinopril  17 [Last Taken Unknown]


Protonix  17 [Last Taken Unknown]


Iron  18 [Last Taken Unknown]


Magnesium Citrate  18 [Last Taken Unknown]





I have personally reviewed and updated: family history, medical history, social 

history, surgical history





- Past Medical History


Additional medical history: CAD with history MI and  and stents in , HTN, 

HLD, O2 dependence, depression, dementia, history of PE, per tinnitus, C7 

fracture after fall in 





- Surgical History


Additional surgical history: Bilateral cataract extraction with lens placement, 

appendectomy , lumpectomy in the breast benign Harman , cardiac cath with 

stenting.





- Family History


Additional family history: Mother  with history of CAD and MI.  Patient 

has multiple adult children who are all healthy per her report.





- Social History


Smoking Status: Former smoker


Alcohol Use: None


Drug Use: None


Additional social history: Patient resides independent living.  She has several 

children who live locally.  Cor status-per most form brought in by patient and 

confirmed by her is limited.  No CPR.  Intubation and other treatments are okay.





Review of Systems


Review of Systems: 





ROS: 10pt was reviewed & negative except for what was stated in HPI & below


Constitutional: Reports: no symptoms


EENMT: Reports: no symptoms


Cardiac: Reports: no symptoms


Respiratory: Reports: shortness of breath (Reported at baseline.  Patient was 

continuous oxygen at home.)


Gastrointestinal: Reports: no symptoms


Genitourinary: Reports: no symptoms


Muscolosketal: Reports: muscle pain (Muscle cramping bilateral lower legs.  No 

calf pain or tenderness).  Denies: calf pain


Skin: Reports: no symptoms


Neurological: Reports: no symptoms


Hematologic/Lymphatic: Reports: no symptoms





Physical Exam


Physical Exam: 








 Selected Entries











  18





  03:15


 


Blood Pressure Automatic





Method 


 


Heart Rate 68


 


Respiratory 18





Rate 


 


O2 Sat (%) 87 L


 


Temperature (C) 37.1 C


 


Blood Pressure 132/65 H


 


Mean Arterial 87





Pressure (MAP) 


 


O2 (L/minute) 2


 


O2 Delivery Room Air





Mode 


 


Temperature Oral





Source 

















Temp Pulse Resp BP Pulse Ox


 


 36.4 C   59 L  20   124/64 H  92 


 


 18 05:00  18 05:00  18 05:00  18 05:00  18 05:00




















O2 (L/minute)                  2














Constitutional: no apparent distress


Eyes: PERRL (Bilateral lens reflex appreciated.), anicteric sclera, EOMI, No 

scleral injection


Ears, Nose, Mouth, Throat: moist mucous membranes, no oral mucosal ulcers, No 

poor dentition (Dentures in place.)


Cardiovascular: regular rate and rhythym, no murmur, rub, or gallop, pulses 

symmetric bilaterally, No edema


Peripheral Pulses: 1+: dorsalis-pedis (R), dorsalis-pedis (L)


Respiratory: no respiratory distress, inspiratory crackles (With occasional 

bibasilar crackles.), No expiratory wheeze, No respiratory distress


Gastrointestinal: normoactive bowel sounds, soft, non-tender abdomen, no 

palpable masses, No distension


Genitourinary: no bladder tenderness, other (No suprapubic tenderness.  No CVA 

tenderness.), No ferrari in urethra


Skin: warm, normal color, no rashes or abrasions


Musculoskeletal: joint effusion (Patient oriented to person and place.  Some 

memory deficits.), generalized weakness (Generalized weakness and 

deconditioning.  Patient is able to sit up independently.), No pain with ROM


Neurologic: sensation intact bilaterally, CN II-XII Intact, other (Patient 

awake alert and oriented to person place.), No numbness, No facial droop


Psychiatric: not anxious, not encephalopathic, thought process linear, poor 

memory





Lab Data & Imaging Review





 18 03:00





 18 03:00














WBC  5.61 10^3/uL (3.80-9.50)   18  03:00    


 


RBC  3.20 10^6/uL (4.18-5.33)  L  18  03:00    


 


Hgb  11.4 g/dL (12.6-16.3)  L  18  03:00    


 


Hct  35.4 % (38.0-47.0)  L  18  03:00    


 


MCV  110.6 fL (81.5-99.8)  H  18  03:00    


 


MCH  35.6 pg (27.9-34.1)  H  18  03:00    


 


MCHC  32.2 g/dL (32.4-36.7)  L  18  03:00    


 


RDW  12.8 % (11.5-15.2)   18  03:00    


 


Plt Count  185 10^3/uL (150-400)   18  03:00    


 


MPV  12.0 fL (8.7-11.7)  H  18  03:00    


 


Neut % (Auto)  52.1 % (39.3-74.2)   18  03:00    


 


Lymph % (Auto)  32.4 % (15.0-45.0)   18  03:00    


 


Mono % (Auto)  9.4 % (4.5-13.0)   18  03:00    


 


Eos % (Auto)  5.5 % (0.6-7.6)   18  03:00    


 


Baso % (Auto)  0.4 % (0.3-1.7)   18  03:00    


 


Nucleat RBC Rel Count  0.0 % (0.0-0.2)   18  03:00    


 


Absolute Neuts (auto)  2.92 10^3/uL (1.70-6.50)   18  03:00    


 


Absolute Lymphs (auto)  1.82 10^3/uL (1.00-3.00)   18  03:00    


 


Absolute Monos (auto)  0.53 10^3/uL (0.30-0.80)   18  03:00    


 


Absolute Eos (auto)  0.31 10^3/uL (0.03-0.40)   18  03:00    


 


Absolute Basos (auto)  0.02 10^3/uL (0.02-0.10)   18  03:00    


 


Absolute Nucleated RBC  0.00 10^3/uL (0-0.01)   18  03:00    


 


Immature Gran %  0.2 % (0.0-1.1)   18  03:00    


 


Immature Gran #  0.01 10^3/uL (0.00-0.10)   18  03:00    


 


PT  12.7 SEC (12.0-15.0)   18  03:00    


 


INR  0.93  (0.83-1.16)   18  03:00    


 


APTT  22.9 SEC (23.0-38.0)  L  18  03:00    


 


Sodium  143 mEq/L (135-145)   18  03:00    


 


Potassium  4.6 mEq/L (3.5-5.2)   18  03:00    


 


Chloride  101 mEq/L ()   18  03:00    


 


Carbon Dioxide  32 mEq/l (22-31)  H  18  03:00    


 


Anion Gap  10 mEq/L (8-16)   18  03:00    


 


BUN  24 mg/dL (7-23)  H  18  03:00    


 


Creatinine  0.8 mg/dL (0.6-1.0)   18  03:00    


 


Estimated GFR  > 60   18  03:00    


 


Glucose  87 mg/dL ()   18  03:00    


 


Calcium  9.5 mg/dL (8.5-10.4)   18  03:00    


 


Magnesium  1.9 mg/dL (1.6-2.3)   18  03:00    


 


Total Bilirubin  0.2 mg/dL (0.1-1.4)   18  03:00    


 


Conjugated Bilirubin  0.2 mg/dL (0.0-0.5)   18  03:00    


 


Unconjugated Bilirubin  0.0 mg/dL (0.0-1.1)   18  03:00    


 


AST  30 IU/L (14-46)   18  03:00    


 


ALT  39 IU/L (9-52)   18  03:00    


 


Alkaline Phosphatase  86 IU/L ()   18  03:00    


 


Creatine Kinase  40 IU/L (0-156)   18  03:00    


 


CK-MB (CK-2) Fraction  1.27 ng/mL (0.00-3.19)   18  03:00    


 


Troponin I  < 0.012 ng/mL (0.000-0.034)   18  03:00    


 


NT-Pro-B Natriuret Pep  688 pg/mL (0-450)  H  18  03:00    


 


Total Protein  6.4 g/dL (6.3-8.2)   18  03:00    


 


Albumin  3.7 g/dL (3.5-5.0)   18  03:00    


 


Lipase  529 IU/L ()  H  18  03:00    








Imaging Review: 





Chest x-ray image reviewed myself report is still pending-patient with the 

stable thoracic scoliosis AH, continue persistent patchy atelectasis 

bibasilarly not significantly worsened compared to x-ray from 2018.  

Persistent on right upper lobe spiculated nodule.








Visualized and Interpreted Chest x-ray results: Yes


Visualized and Interpreted EKG results: Yes


EKG additional interpertation: EKG reviewed myself showing normal sinus rhythm 

in the 70s.  Lad with LVH and report changes.  Q-waves in anterior leads likely 

secondary to LVH.  T-wave inversion in the inferior leads.  EKG was compared to 

that from  without significant changes.





Assessment & Plan


Assessment: 








Pleasant 87-year-old female with past medical history significant for CAD with 

history of stenting, TIA, HTN, HLD, chronic hypoxia on O2 dependence, dementia, 

depression who presents emergency department today with complaints of bilateral 

lower extremity cramping with subsequent radiation to her left chest.





Chest pain (Acute) - quite atypical.  Patient's main complaint is that of lower 

extremity cramping with extension up to her right leg with increased severity 

in pain with subsequent radiation through her abdomen up into her chest with 

palpitations.  Patient's EKG is not significantly changed from previous in 

March.  Troponin is negative and other laboratory studies are not significantly 

abnormal from baseline.  Patient received aspirin prior to arrival via EMS.  On 

the case patient's pain with the low suspicion for cardiac etiology rather 

possibly even related to pain or anxiety.  Patient does have a remote history 

of PE she is not currently on anticoagulation but she has no tachycardia and no 

worsened hypoxia from her baseline with requirements and O2 at 2 liters/minute.

  Will plan to trend cardiac enzymes and repeat EKG in the morning if no 

significant change will anticipate the patient could be discharged after ruled 

out.  Additionally patient does have an enlarging right upper lobe on 

spiculated nodule that is concerning for malignancy.  Patient's family was 

advised during a recent ER visit with recommendations to follow up with her 

PCP.  there is no family at bedside to further discuss.





Leg cramping - possibly restless leg versus cramping related to electrolyte 

disturbance or anemia.  Currently patient is comfortable.  She has no evidence 

of calf swelling tenderness or pain.





CAD - continue aspirin and Plavix.  Patient is on statin Ace but no beta-

blocker.





Anemia - likely of chronic disease.  H&H is stable at this time no evidence of 

active bleeding.  Follow up with PCP.  Patient is on iron supplementation 

already which can be resumed after discharge.





Dementia - continue patient's donepezil





Benign essential hypertension - blood pressures at this time are acceptable.  

No evidence of hypertensive urgency.





Hyperlipidemia - resume statin at discharge.





Depression - continue Cymbalta and trazodone.





Chronic hypoxic respiratory failure with continued oxygen needs.  Albuterol 

p.r.n..





FEN - advance diet to cardiac as tolerated.  Electrolyte monitoring replacement 

if needed.  Saline lock IV at this time.


PPX-SCDs.  Patient is on Plavix.  Anticipate short hospital stay will encourage 

mobilization.  Lovenox if patient should stay additional day.


Cor status-is limited.  Patient does not want any cardiac resuscitation and she 

confirms her wishes.  She otherwise desires on intubation and other medical 

treatment.


Disposition-patient admitted observation status on the PCU unit for close 

cardiac monitoring.  Anticipate repeat cardiac enzyme and EKG will be similar 

and anticipate she will be able to be discharged back home to her independent 

facility.  Is not clear if patient's family has been notified from the patient'

s care facility as patient states normally her daughter or son would be at 

bedside if called.  Will further contact this morning as per day team.

## 2018-05-05 NOTE — EDPHY
H & P


Time Seen by Provider: 05/05/18 03:20


HPI/ROS: 





HPI





CHIEF COMPLAINT:  Chest pain





HISTORY OF PRESENT ILLNESS:  Patient is a 87-year-old female she has a history 

of coronary artery disease with stents, anemia, dementia and hypertension, 

hyperlipidemia, presents emergency room with very vague chest discomfort.  She 

states that her feet were cramping her this woke her up and then she developed 

some chest discomfort.  She has a history of coronary artery disease and states 

that she became concerned that she may be having a heart attack and decided 

call 911 come to the emergency room.  She did receive full-dose aspirin EN 

route by EMS.  She arrives to emergency room chest pain-free.  She states she 

now feels fine.  But earlier what woke her from sleep with some bilateral feet 

cramping and then doubt developed some chest discomfort.





Past Medical History:  Coronary artery disease with stents, anemia, dementia, 

hypertension, hyperlipidemia





Past Surgical History:  No recent surgery





Social History:  Lives at the [facility].





Family History:  Noncontributory








ROS   


REVIEW OF SYSTEMS:


A comprehensive 10 point review of systems is otherwise negative aside from 

elements mentioned in the history of present illness.








Exam   


Constitutional  elderly, frail, otherwise nontoxic appearing, triage nursing 

summary reviewed, vital signs reviewed, awake/alert. 


Eyes   normal conjunctivae and sclera, EOMI, PERRLA. 


HENT   normal inspection, atraumatic, moist mucus membranes, no epistaxis, neck 

supple/ no meningismus, no raccoon eyes. 


Respiratory   clear to auscultation bilaterally, normal breath sounds, no 

respiratory distress, no wheezing. 


Cardiovascular   rate normal, regular rhythm, no murmur, no edema, distal 

pulses normal. 


Gastrointestinal   soft, non-tender, no rebound, no guarding, normal bowel 

sounds, no distension, no pulsatile mass. 


Genitourinary   no CVA tenderness. 


Musculoskeletal  no midline vertebral tenderness, full range of motion, no calf 

swelling, no tenderness of extremities, no meningismus, good pulses, 

neurovascularly intact.


Skin   pink, warm, & dry, no rash, skin atraumatic. 


Neurologic   awake, alert and oriented x 3, AAOx3, moves all 4 extremities 

equally, motor intact, sensory intact, CN II-XII intact, normal cerebellar, 

normal vision, normal speech. 


Psychiatric   normal mood/affect. 


Heme/Lymph/Immune   no lymphadenopathy.





Differential diagnosis includes but is not limited to:  ACS, atypical chest pain

, pneumothorax, pneumonia, pulmonary embolism, aortic dissection, congestive 

heart failure, tumor, musculoskeletal pain, esophageal pain, GERD, peptic ulcer 

disease, pancreatitis





Medical Decision Making:  Plan for this patient IV establishment, blood draw, 

chest x-ray, EKG, and re-evaluate.  Rule out acute coronary syndrome.  Check EKG

, troponin, chest x-ray, blood work.





Re-evaluation:








EKG interpretation by me on record in [employer] system.  Impression time of 

EKG 3:22 a.m., sinus rhythm rate of 70 LVH present.  Q-waves noted V1 V2 no ST 

elevation no significant ST depression.  There are T-wave abnormality in lead 3 

AVF.  This is very similar to previous EKG dated 3/13/2018.








ED x-ray chest one view negative for focal pneumonia.  Image interpreted by 

myself.





0422:  Long discussion with the patient.  Agreeable for admission for further 

chest pain evaluation.  Currently this time she is resting comfortably in his 

chest pain-free.





Given her risk factors includes age, coronary artery disease, hypertension, 

hyperlipidemia will admit to the hospital further chest pain evaluation.


Source: Patient, EMS





- Personal History


Tetanus Vaccine Date: >10 YRS





- Medical/Surgical History


Hx Asthma: No


Hx Chronic Respiratory Disease: No


Hx Diabetes: No


Hx Cardiac Disease: Yes


Hx Renal Disease: No


Hx Cirrhosis: No


Hx Alcoholism: No


Hx HIV/AIDS: No


Hx Splenectomy or Spleen Trauma: No


Other PMH: CAD, angioplasty '04, TIA '09, depression, hyperlipidemia, MI '85, 

stomach ulcer, lap cholle '04, total hysterectomy '94, appendectomy '60, benign 

lumpectomy L '67.  home o2, htn, dementia.  depressiono, PE.  peritonitis.  c7 

spinous process fx after fall 1/15.





- Social History


Smoking Status: Former smoker


Constitutional: 


 Initial Vital Signs











Temperature (C)  37.1 C   05/05/18 03:15


 


Heart Rate  68   05/05/18 03:15


 


Respiratory Rate  18   05/05/18 03:15


 


Blood Pressure  132/65 H  05/05/18 03:15


 


O2 Sat (%)  87 L  05/05/18 03:15








 











O2 Delivery Mode               Room Air


 


O2 (L/minute)                  2














Allergies/Adverse Reactions: 


 





Sulfa (Sulfonamide Antibiotics) Allergy (Verified 03/13/18 06:01)


 








Home Medications: 














 Medication  Instructions  Recorded


 


Acetaminophen [Tylenol 325mg (*)] 325 - 650 mg PO Q6HRS PRN 05/05/18


 


Albuterol [Proventil Inhaler HFA 1 puffs IH BID 05/05/18





(*)]  


 


Atorvastatin Calcium [Lipitor 20 20 mg PO HS 05/05/18





mg (*)]  


 


Cholecalciferol Vit D3 [Vitamin D3 1,000 units PO DAILY 05/05/18





(*)]  


 


Clopidogrel Bisulfate [Plavix (*)] 75 mg PO DAILY 05/05/18


 


Cyanocobalamin (Vitamin B-12) 1,000 mcg PO DAILY 05/05/18





[B-12]  


 


DULoxetine [Cymbalta 60 MG (*)] 60 mg PO DAILY 05/05/18


 


Donepezil HCl [Aricept] 10 mg PO DAILY 05/05/18


 


Ferrous Sulfate [Ferrous Sulf 325 1 tab PO HS 05/05/18





MG (*)]  


 


Herbals/Supplements -Info Only 1 ea PO DAILY 05/05/18


 


Isosorbide Mononitrate [Imdur 30 30 mg PO HS #30 tab.sr 05/05/18





mg (*)]  


 


Metoprolol Succinate Xr [Toprol Xl 12.5 mg PO DAILY 05/05/18





25 mg (*)]  


 


Mirabegron [Myrbetriq] 50 mg PO DAILY 05/05/18


 


Multivitamins [Multivitamin (*)] 1 each PO DAILY 05/05/18


 


Nitroglycerin [Nitrostat 0.4 mg 0.4 mg SL Q5M PRN #10 btl 05/05/18





(*)]  


 


Pantoprazole Sodium [Protonix 40mg 40 mg PO HS 05/05/18





(*)]  


 


Vit C/E/Zn/Coppr/Lutein/Zeaxan 1 cap PO BID 05/05/18





[Ocuvite Lutein & Zeaxanthin Cp]  


 


traZODone [traZODONE 50MG (*)] 50 mg PO HS PRN 05/05/18














Medical Decision Making





- Data Points


Laboratory Results: 


 Laboratory Results





 05/05/18 03:00 





 05/05/18 03:00 








Medications Given: 


 








Discontinued Medications





Sodium Chloride (Ns)  500 mls @ 1,000 mls/hr IV EDNOW ONE


   PRN Reason: Protocol


   Stop: 05/05/18 03:48


   Last Admin: 05/05/18 03:45 Dose:  500 mls








Departure





- Departure


Disposition: [facility]


Clinical Impression: 


Chest pain


Qualifiers:


 Chest pain type: unspecified Qualified Code(s): R07.9 - Chest pain, unspecified





Condition: Fair

## 2018-05-05 NOTE — CPEKG
Heart Rate: 70

RR Interval: 857

P-R Interval: 196

QRSD Interval: 86

QT Interval: 416

QTC Interval: 449

P Axis: 63

QRS Axis: -42

T Wave Axis: -20

EKG Severity - ABNORMAL ECG -

EKG Impression: SINUS RHYTHM

EKG Impression: LEFT AXIS DEVIATION

EKG Impression: PROBABLE LVH WITH SECONDARY REPOL ABNRM

EKG Impression: ANTERIOR Q WAVES, POSSIBLY DUE TO LVH

Electronically Signed By: Orly Cheung 05-May-2018 22:36:04

## 2018-08-25 ENCOUNTER — HOSPITAL ENCOUNTER (EMERGENCY)
Dept: HOSPITAL 80 - FED | Age: 83
Discharge: HOME | End: 2018-08-25
Payer: COMMERCIAL

## 2018-08-25 VITALS — DIASTOLIC BLOOD PRESSURE: 96 MMHG | SYSTOLIC BLOOD PRESSURE: 147 MMHG

## 2018-08-25 DIAGNOSIS — E78.5: ICD-10-CM

## 2018-08-25 DIAGNOSIS — J44.9: ICD-10-CM

## 2018-08-25 DIAGNOSIS — F03.90: ICD-10-CM

## 2018-08-25 DIAGNOSIS — Z87.891: ICD-10-CM

## 2018-08-25 DIAGNOSIS — I25.10: ICD-10-CM

## 2018-08-25 DIAGNOSIS — Z95.5: ICD-10-CM

## 2018-08-25 DIAGNOSIS — I10: ICD-10-CM

## 2018-08-25 DIAGNOSIS — M79.622: Primary | ICD-10-CM

## 2018-08-25 LAB
INR PPP: 0.96 (ref 0.83–1.16)
PLATELET # BLD: 186 10^3/UL (ref 150–400)
PROTHROMBIN TIME: 13 SEC (ref 12–15)

## 2018-08-25 NOTE — EDPHY
H & P


Time Seen by Provider: 08/25/18 13:00


HPI/ROS: 





HPI


Left arm pain.  History of coronary artery disease.





87-year-old female by private vehicle with her daughter.  This patient has a 

history of coronary artery disease.  Her last stent was placed about 4 years 

ago.  Her cardiologist is currently Dr. Bowen Myles.  She is on metoprolol, 

Plavix and p.r.n. Nitroglycerin.  She reports that at 9:00 a.m. This morning 

she developed which she describes as aching in her left arm.  No history of 

trauma to the extremity.  She does not think she fell asleep awkwardly on it.  

She reports that this pain is better now.  She denied any new shortness of 

breath or chest pain.





ROS:





Constitutional:  No fever, no chills.  No weakness.


Eyes:  No discharge.  No changes in vision.


ENT:  No sore throat.  No nasal congestion or rhinorrhea.


Respiratory:  No cough.  No shortness of breath.


Cardiac:  No chest pain, no palpitations.


Gastrointestinal:  No abdominal pain, no vomiting, no diarrhea.


Genitourinary:  No hematuria.  No dysuria or increased frequency with urination.


Musculoskeletal:  No back pain.  No neck pain.  As above.


Skin:  No rashes.


Neurological:  No headache.  No focal weakness or altered sensation.





Past medical history:  Coronary artery disease.  TIA, depression, hyperlipidemia

, stomach ulcers, cholecystectomy, hysterectomy, appendectomy, hypertension, 

COPD on home oxygen 3 L 24-7, dementia, pulmonary embolism.  C7 spinous process 

fracture.





Social history:  Former smoker.  No alcohol.  Lives alone with a cat.  The 

daughter lives close by and is actively involved with her life and care.  

Daughter is present in the room currently.





Physical Exam:





General Appearance:  Alert, pleasant 87-year-old female, no distress.  This 

patient is responding to questions appropriately and in full sentences.  This 

patient appears well-hydrated and well-nourished.


Eyes:  Pupils equal and round no pallor or injection.  No lid edema, erythema 

or injection.


Respiratory:  There are no retractions, lungs are clear to auscultation with 

good air movement bilaterally.


Cardiovascular:  Regular rate and rhythm.  No murmur appreciated.


Gastrointestinal:  Abdomen is soft and nontender, no masses, bowel sounds 

normal.  No focal tenderness at McBurney's point.  No Santoro sign.


Neurological:  Motor sensory function is grossly intact.  Cranial nerves are 

normal. 


Skin:  Warm and dry, no rashes.


Musculoskeletal:  Neck is supple and nontender.  No midline cervical, upper 

thoracic tenderness on palpation.  No paraspinal tenderness on palpation.


Extremities are symmetrical.  All joints range without pain or impingement.  

Evaluation of the left upper extremity reveals no evidence of trauma.  No pain 

on palpation of long bones.


Psychiatric:  No agitation.  No depression.





Database:





EKG:





EKG time is 12:53 p.m.; EKG shows a narrow complex normal sinus rhythm with a 

ventricular rate of 62. Left anterior fascicular block noted.  QS waves in the 

anterior precordial leads.  The CT, QRS, QT intervals are within normal limits.

  There are no ST-T wave changes indicative of ischemic or injury pattern.  No 

evidence of right heart strain.  Interpreted by me.





Imaging:





Chest x-ray PA and lateral; the cardiac mediastinal silhouette is unremarkable.

  No evidence of infiltrate or pneumothorax.  No acute cardiopulmonary disease 

process noted.  Interpreted by me.





Procedures:





Emergency department course:





Triage vital signs reviewed.  The patient is moderately hypertensive.  Vital 

signs otherwise normal.  IV placed.  Patient placed on a cardiac monitor.  She 

was given 324 mg of chewed aspirin.  She is currently asymptomatic in the 

emergency department.  EKG was obtained and reviewed by myself.





2:40 p.m., patient re-evaluated.  She remains asymptomatic.  I discussed the 

results of her emergency department workup with both her and her daughter.  

Based on her heart score she is moderate risk.  I discussed this thoroughly 

with the patient and her daughter.  She does not want to be admitted at this 

time.  Her daughter tells me that her cardiologist, Dr. Bowen Myles, is also 

advocating a less aggressive approach to management of this patient's heart 

disease.  The patient comes from [facility] assisted-living [facility].  She 

can easily return to the emergency department should her symptoms return.  I 

did discuss her anemia and explained that this needed to be followed up on by 

her primary care physician.  She has not had any bloody or melenic stool.  She 

does not feel fatigued or lightheaded.  She and her daughter do not want her 

admitted at this time.  The patient and daughter competently engages in shared 

decision making.  They demonstrate capacitance to make decisions.  They 

understand the risks of being discharged against medical advice.  She will 

follow up with her cardiologist early next week for re-evaluation.  Return to 

emergency department precautions thoroughly reviewed.  All of their questions 

were answered.  She was discharged from the emergency department in good 

condition.





Differential Diagnosis:





The differential diagnosis on this patient includes but is not limited to 

musculoskeletal left arm pain, anginal equivalent.  Fracture, subluxation, 

dislocation involving the left upper extremity unlikely, cervical radiculopathy

, brachial plexus injury unlikely.  This represents a partial list of diagnoses 

considered.  These considerations are based on history, physical exam, past 

history, reassessment and diagnostic testing.


Smoking Status: Former smoker


Constitutional: 


 Initial Vital Signs











Temperature (C)  36.5 C   08/25/18 12:44


 


Heart Rate  74   08/25/18 12:44


 


Respiratory Rate  14   08/25/18 12:44


 


Blood Pressure  171/78 H  08/25/18 12:44


 


O2 Sat (%)  95   08/25/18 12:44








 











O2 Delivery Mode               Nasal Cannula


 


O2 (L/minute)                  3.5














Allergies/Adverse Reactions: 


 





Sulfa (Sulfonamide Antibiotics) Allergy (Verified 03/13/18 06:01)


 








Home Medications: 














 Medication  Instructions  Recorded


 


Acetaminophen [Tylenol 325mg (*)] 325 - 650 mg PO Q6HRS PRN 05/05/18


 


Albuterol [Proventil Inhaler HFA 1 puffs IH BID 05/05/18





(*)]  


 


Atorvastatin Calcium [Lipitor 20 20 mg PO HS 05/05/18





mg (*)]  


 


Cholecalciferol Vit D3 [Vitamin D3 1,000 units PO DAILY 05/05/18





(*)]  


 


Clopidogrel Bisulfate [Plavix (*)] 75 mg PO DAILY 05/05/18


 


Cyanocobalamin (Vitamin B-12) 1,000 mcg PO DAILY 05/05/18





[B-12]  


 


DULoxetine [Cymbalta 60 MG (*)] 60 mg PO DAILY 05/05/18


 


Donepezil HCl [Aricept] 10 mg PO DAILY 05/05/18


 


Ferrous Sulfate [Ferrous Sulf 325 1 tab PO HS 05/05/18





MG (*)]  


 


Herbals/Supplements -Info Only 1 ea PO DAILY 05/05/18


 


Isosorbide Mononitrate [Imdur 30 30 mg PO HS #30 tab.sr 05/05/18





mg (*)]  


 


Metoprolol Succinate Xr [Toprol Xl 12.5 mg PO DAILY 05/05/18





25 mg (*)]  


 


Mirabegron [Myrbetriq] 50 mg PO DAILY 05/05/18


 


Multivitamins [Multivitamin (*)] 1 each PO DAILY 05/05/18


 


Nitroglycerin [Nitrostat 0.4 mg 0.4 mg SL Q5M PRN #10 btl 05/05/18





(*)]  


 


Pantoprazole Sodium [Protonix 40mg 40 mg PO HS 05/05/18





(*)]  


 


Vit C/E/Zn/Coppr/Lutein/Zeaxan 1 cap PO BID 05/05/18





[Ocuvite Lutein & Zeaxanthin Cp]  


 


traZODone [traZODONE 50MG (*)] 50 mg PO HS PRN 05/05/18














Medical Decision Making





- Data Points


Laboratory Results: 


 Laboratory Results





 08/25/18 13:35 





 08/25/18 12:51 








Point of Care Test Results: 


 Chemistry











  08/25/18





  12:48


 


POC Troponin I  0.03 ng/mL ng/mL





   (0.00-0.08) 














Departure





- Departure


Disposition: Home, Routine, Self-Care


Clinical Impression: 


 Left arm pain, History of coronary artery disease





Condition: Good


Instructions:  Angina (ED)


Additional Instructions: 


Read and follow provided instructions.





Follow-up with your primary care physician on Monday for re-evaluation.  You 

should have your blood counts rechecked.  You are more anemic than usual in the 

emergency department today.  You should also follow up with your cardiologist, 

Dr. Bowen Myles, early next week for re-evaluation as well.





Take your medication as prescribed.





Return to the emergency department for chest pain, return of your left arm pain

, lightheadedness, blood in your stool, black stool or other serious concerns.


Referrals: 


Patient,NotPresent [Unknown] - As per Instructions


Bowen Myles MD [Medical Doctor] - As per Instructions

## 2018-08-25 NOTE — CPEKG
Test Reason : OPEN

Blood Pressure : ***/*** mmHG

Vent. Rate : 062 BPM     Atrial Rate : 061 BPM

   P-R Int : 176 ms          QRS Dur : 087 ms

    QT Int : 438 ms       P-R-T Axes : 037 -42 -33 degrees

   QTc Int : 445 ms

 

Sinus rhythm

Left anterior fascicular block

Anterior infarct, old

 

Confirmed by Wade Arguello ([id number]) on 8/25/2018 3:00:55 PM

 

Referred By:             Confirmed By:Wade Arguello

## 2018-08-29 ENCOUNTER — HOSPITAL ENCOUNTER (EMERGENCY)
Dept: HOSPITAL 80 - FED | Age: 83
Discharge: HOME | End: 2018-08-29
Payer: COMMERCIAL

## 2018-08-29 VITALS — DIASTOLIC BLOOD PRESSURE: 77 MMHG | SYSTOLIC BLOOD PRESSURE: 153 MMHG

## 2018-08-29 DIAGNOSIS — I25.10: ICD-10-CM

## 2018-08-29 DIAGNOSIS — Z87.891: ICD-10-CM

## 2018-08-29 DIAGNOSIS — Z95.5: ICD-10-CM

## 2018-08-29 DIAGNOSIS — R07.9: Primary | ICD-10-CM

## 2018-08-29 LAB
INR PPP: 0.96 (ref 0.83–1.16)
PLATELET # BLD: 200 10^3/UL (ref 150–400)
PROTHROMBIN TIME: 13 SEC (ref 12–15)

## 2018-08-29 NOTE — CPEKG
Test Reason : OPEN

Blood Pressure : ***/*** mmHG

Vent. Rate : 056 BPM     Atrial Rate : 056 BPM

   P-R Int : 191 ms          QRS Dur : 089 ms

    QT Int : 443 ms       P-R-T Axes : 050 -37 006 degrees

   QTc Int : 428 ms

 

Sinus rhythm

Left axis deviation

Anteroseptal infarct, old

 

Confirmed by McCollester, Laughlin ([id number]) on 8/29/2018 9:15:46 AM

 

Referred By:             Confirmed By:Laughlin McCollester

## 2018-08-29 NOTE — EDPHY
H & P


Time Seen by Provider: 08/29/18 08:05


HPI/ROS: 





HPI


Chest pain.





87-year-old female by ambulance from the [facility] assisted living facility.  

This patient is very familiar to our emergency department.  She has a history 

of known coronary artery disease with stents.  She has ongoing angina.  It has 

been agreed between herself, her family, her cardiologist Dr. Bowen Myles and 

internal medicine during her last admission in May of this year that she is not 

a candidate for percutaneous coronary intervention.  Everyone is in agreement 

to manage her angina medically.  I just saw this patient several days ago for 

the same complaint.  She had a negative workup at that time.  Her daughter was 

present with her during that time.  Her daughter did not want her to be 

admitted for further evaluation.  Her daughter currently is not with her.  The 

patient states that she developed chest discomfort at about 3:00 a.m. Last 

night.  She took her nitroglycerin but does not think it helped.  However, she 

states that she feels fine now and denies any chest discomfort and is 

requesting discharge back to assisted living.





ROS:





Constitutional:  No fever, no chills.  No weakness.


Eyes:  No discharge.  No changes in vision.


ENT:  No sore throat.  No nasal congestion or rhinorrhea.


Respiratory:  No cough.  No shortness of breath.


Cardiac:  As above, no palpitations.


Gastrointestinal:  No abdominal pain, no vomiting, no diarrhea.


Genitourinary:  No hematuria.  No dysuria or increased frequency with urination.


Musculoskeletal:  No back pain.  No neck pain.  No myalgias or arthralgias.


Skin:  No rashes.


Neurological:  No headache.  No focal weakness or altered sensation.





Past medical history:  Angina with medical management only as above, angioplasty

, TIA, depression, hyperlipidemia, stomach ulcers, lap choly, hysterectomy, 

appendectomy, hypertension, PE.  Cardiac medications include nitroglycerin, 

metoprolol, Plavix.  She has a history of chronic lung disease and is on 2-3 L 

of oxygen 24-7.





Social history:  Has a daughter who is very involved with her life and care.  

Former smoker.  No alcohol.





Physical Exam:





General Appearance:  Alert, no distress.  This patient is responding to 

questions appropriately and in full sentences.  This patient appears well-

hydrated and well-nourished.


Eyes:  Pupils equal and round no pallor or injection.  No lid edema, erythema 

or injection.


Respiratory:  There are no retractions, lungs are clear to auscultation with 

good air movement bilaterally.


Cardiovascular:  Regular rate and rhythm.  No murmur.


Gastrointestinal:  Abdomen is soft and nontender, no masses, bowel sounds 

normal.  No focal tenderness at McBurney's point.  No Santoro sign.


Neurological:  Motor sensory function is grossly intact.  Cranial nerves are 

normal.  Gait is normal.


Skin:  Warm and dry, no rashes.


Musculoskeletal:  Neck is supple and nontender.


Extremities are symmetrical.  All joints range without pain or impingement.


Psychiatric:  No agitation.  No depression.





Database:





EKG:





EKG time is 8:17 a.m.; EKG shows a narrow complex normal sinus rhythm with a 

ventricular rate of 56. Left axis deviation noted.  Old anterior septal infarct 

noted.  The WI, QRS, QT intervals are within normal limits.  There are no ST-T 

wave changes indicative of ischemic or injury pattern.  No evidence of right 

heart strain.  Interpreted by me.





Imaging:





Chest x-ray AP portable:  No acute cardiopulmonary disease process noted.  

Interpreted by me.





Procedures:





Emergency department course:





Triage vital signs reviewed and are baseline.  This patient's workup again in 

the emergency department is unremarkable.  She is requesting discharge to home.

  I feel this is reasonable given her history and current management plan.  She 

has had no chest discomfort or shortness of breath in the emergency department.

  I discussed follow-up with her cardiologist for re-evaluation and to review 

her medical management plan.  Return to emergency department precautions were 

thoroughly reviewed.  All of her questions were answered.  She was discharged 

from the emergency department in good condition back to her assisted living 

facility.





Differential Diagnosis:





The differential diagnosis on this patient includes but is not limited to angina

, history of coronary artery disease.  Acute myocardial infarction, pulmonary 

embolism, aortic dissection, myocarditis, pericarditis unlikely.  This 

represents a partial list of diagnoses considered.  These considerations are 

based on history, physical exam, past history, reassessment and diagnostic 

testing.


Smoking Status: Former smoker


Constitutional: 


 Initial Vital Signs











Temperature (C)  36.5 C   08/29/18 08:13


 


Heart Rate  71   08/29/18 08:13


 


Respiratory Rate  20   08/29/18 08:13


 


Blood Pressure  152/84 H  08/29/18 08:13


 


O2 Sat (%)  91 L  08/29/18 08:13








 











O2 Delivery Mode               Nasal Cannula


 


O2 (L/minute)                  2














Allergies/Adverse Reactions: 


 





Sulfa (Sulfonamide Antibiotics) Allergy (Verified 08/29/18 08:16)


 








Home Medications: 














 Medication  Instructions  Recorded


 


Acetaminophen [Tylenol 325mg (*)] 325 - 650 mg PO Q6HRS PRN 05/05/18


 


Albuterol [Proventil Inhaler HFA 1 puffs IH BID 05/05/18





(*)]  


 


Atorvastatin Calcium [Lipitor 20 20 mg PO HS 05/05/18





mg (*)]  


 


Cholecalciferol Vit D3 [Vitamin D3 1,000 units PO DAILY 05/05/18





(*)]  


 


Clopidogrel Bisulfate [Plavix (*)] 75 mg PO DAILY 05/05/18


 


Cyanocobalamin (Vitamin B-12) 1,000 mcg PO DAILY 05/05/18





[B-12]  


 


DULoxetine [Cymbalta 60 MG (*)] 60 mg PO DAILY 05/05/18


 


Donepezil HCl [Aricept] 10 mg PO DAILY 05/05/18


 


Ferrous Sulfate [Ferrous Sulf 325 1 tab PO HS 05/05/18





MG (*)]  


 


Herbals/Supplements -Info Only 1 ea PO DAILY 05/05/18


 


Isosorbide Mononitrate [Imdur 30 30 mg PO HS #30 tab.sr 05/05/18





mg (*)]  


 


Metoprolol Succinate Xr [Toprol Xl 12.5 mg PO DAILY 05/05/18





25 mg (*)]  


 


Mirabegron [Myrbetriq] 50 mg PO DAILY 05/05/18


 


Multivitamins [Multivitamin (*)] 1 each PO DAILY 05/05/18


 


Nitroglycerin [Nitrostat 0.4 mg 0.4 mg SL Q5M PRN #10 btl 05/05/18





(*)]  


 


Pantoprazole Sodium [Protonix 40mg 40 mg PO HS 05/05/18





(*)]  


 


Vit C/E/Zn/Coppr/Lutein/Zeaxan 1 cap PO BID 05/05/18





[Ocuvite Lutein & Zeaxanthin Cp]  


 


traZODone [traZODONE 50MG (*)] 50 mg PO HS PRN 05/05/18














Medical Decision Making





- Diagnostics


Imaging Results: 


 Imaging Impressions





Chest X-Ray  08/29/18 08:10


Impression: 


1. Nothing acute identified.


2. Persistent right upper lobe abnormality. CT evaluation might be considered 

as clinically directed.


 














- Data Points


Laboratory Results: 


 Laboratory Results





 08/29/18 08:10 





 08/29/18 08:10 





 











  08/29/18 08/29/18 08/29/18





  08:22 08:10 08:10


 


WBC      





    


 


RBC      





    


 


Hgb      





    


 


Hct      





    


 


MCV      





    


 


MCH      





    


 


MCHC      





    


 


RDW      





    


 


Plt Count      





    


 


MPV      





    


 


Neut % (Auto)      





    


 


Lymph % (Auto)      





    


 


Mono % (Auto)      





    


 


Eos % (Auto)      





    


 


Baso % (Auto)      





    


 


Nucleat RBC Rel Count      





    


 


Absolute Neuts (auto)      





    


 


Absolute Lymphs (auto)      





    


 


Absolute Monos (auto)      





    


 


Absolute Eos (auto)      





    


 


Absolute Basos (auto)      





    


 


Absolute Nucleated RBC      





    


 


Immature Gran %      





    


 


Immature Gran #      





    


 


PT      13.0 SEC SEC





     (12.0-15.0) 


 


INR      0.96 





     (0.83-1.16) 


 


APTT      20.9 SEC L SEC





     (23.0-38.0) 


 


Sodium    142 mEq/L mEq/L  





    (135-145)  


 


Potassium    4.7 mEq/L mEq/L  





    (3.3-5.0)  


 


Chloride    103 mEq/L mEq/L  





    ()  


 


Carbon Dioxide    34 mEq/l H mEq/l  





    (22-31)  


 


Anion Gap    5 mEq/L L mEq/L  





    (8-16)  


 


BUN    27 mg/dL H mg/dL  





    (7-23)  


 


Creatinine    0.7 mg/dL mg/dL  





    (0.6-1.0)  


 


Estimated GFR    > 60   





    


 


Glucose    93 mg/dL mg/dL  





    ()  


 


Calcium    9.4 mg/dL mg/dL  





    (8.5-10.4)  


 


POC Troponin I  0.02 ng/mL ng/mL    





   (0.00-0.08)   














  08/29/18





  08:10


 


WBC  4.75 10^3/uL 10^3/uL





   (3.80-9.50) 


 


RBC  2.95 10^6/uL L 10^6/uL





   (4.18-5.33) 


 


Hgb  10.2 g/dL L g/dL





   (12.6-16.3) 


 


Hct  32.5 % L %





   (38.0-47.0) 


 


MCV  110.2 fL H fL





   (81.5-99.8) 


 


MCH  34.6 pg H pg





   (27.9-34.1) 


 


MCHC  31.4 g/dL L g/dL





   (32.4-36.7) 


 


RDW  14.4 % %





   (11.5-15.2) 


 


Plt Count  200 10^3/uL 10^3/uL





   (150-400) 


 


MPV  11.9 fL H fL





   (8.7-11.7) 


 


Neut % (Auto)  54.3 % %





   (39.3-74.2) 


 


Lymph % (Auto)  25.5 % %





   (15.0-45.0) 


 


Mono % (Auto)  14.1 % H %





   (4.5-13.0) 


 


Eos % (Auto)  5.3 % %





   (0.6-7.6) 


 


Baso % (Auto)  0.6 % %





   (0.3-1.7) 


 


Nucleat RBC Rel Count  0.0 % %





   (0.0-0.2) 


 


Absolute Neuts (auto)  2.58 10^3/uL 10^3/uL





   (1.70-6.50) 


 


Absolute Lymphs (auto)  1.21 10^3/uL 10^3/uL





   (1.00-3.00) 


 


Absolute Monos (auto)  0.67 10^3/uL 10^3/uL





   (0.30-0.80) 


 


Absolute Eos (auto)  0.25 10^3/uL 10^3/uL





   (0.03-0.40) 


 


Absolute Basos (auto)  0.03 10^3/uL 10^3/uL





   (0.02-0.10) 


 


Absolute Nucleated RBC  0.00 10^3/uL 10^3/uL





   (0-0.01) 


 


Immature Gran %  0.2 % %





   (0.0-1.1) 


 


Immature Gran #  0.01 10^3/uL 10^3/uL





   (0.00-0.10) 


 


PT  





  


 


INR  





  


 


APTT  





  


 


Sodium  





  


 


Potassium  





  


 


Chloride  





  


 


Carbon Dioxide  





  


 


Anion Gap  





  


 


BUN  





  


 


Creatinine  





  


 


Estimated GFR  





  


 


Glucose  





  


 


Calcium  





  


 


POC Troponin I  





  











Medications Given: 


 








Discontinued Medications





Aspirin (Aspirin)  324 mg PO EDNOW ONE


   Stop: 08/29/18 08:11


   Last Admin: 08/29/18 08:19 Dose:  324 mg





Point of Care Test Results: 


 Chemistry











  08/29/18





  08:22


 


POC Troponin I  0.02 ng/mL ng/mL





   (0.00-0.08) 














Departure





- Departure


Disposition: Home, Routine, Self-Care


Clinical Impression: 


 Chest pain, History of coronary artery disease





Condition: Fair


Instructions:  Angina (ED)


Additional Instructions: 


Read and follow provided instructions.





Follow-up with your cardiologist, Dr. Bowen Myles in the next 1-2 days for re-

evaluation and reassessment of your management plan.





Take your medications as prescribed.





Return to the emergency department for persisting chest pain, shortness of 

breath, vomiting or other serious concerns.


Referrals: 


Bowen Myles MD [Medical Doctor] - As per Instructions

## 2019-02-26 ENCOUNTER — HOSPITAL ENCOUNTER (INPATIENT)
Dept: HOSPITAL 80 - FED | Age: 84
LOS: 2 days | Discharge: HOME HEALTH | DRG: 282 | End: 2019-02-28
Attending: INTERNAL MEDICINE | Admitting: INTERNAL MEDICINE
Payer: COMMERCIAL

## 2019-02-26 DIAGNOSIS — Z99.81: ICD-10-CM

## 2019-02-26 DIAGNOSIS — I25.2: ICD-10-CM

## 2019-02-26 DIAGNOSIS — Z86.711: ICD-10-CM

## 2019-02-26 DIAGNOSIS — Z66: ICD-10-CM

## 2019-02-26 DIAGNOSIS — F32.9: ICD-10-CM

## 2019-02-26 DIAGNOSIS — I25.10: ICD-10-CM

## 2019-02-26 DIAGNOSIS — Z95.5: ICD-10-CM

## 2019-02-26 DIAGNOSIS — E78.5: ICD-10-CM

## 2019-02-26 DIAGNOSIS — F03.90: ICD-10-CM

## 2019-02-26 DIAGNOSIS — I10: ICD-10-CM

## 2019-02-26 DIAGNOSIS — I21.4: Primary | ICD-10-CM

## 2019-02-26 DIAGNOSIS — J44.9: ICD-10-CM

## 2019-02-26 LAB — PLATELET # BLD: 209 10^3/UL (ref 150–400)

## 2019-02-26 NOTE — EDPHY
H & P


Stated Complaint: cp (resolved pta)


Time Seen by Provider: 02/26/19 23:00


HPI/ROS: 





Chief Complaint:  Chest pain





HPI:  88-year-old woman with a history of known coronary artery disease, 

patient of doctor Shar.  Patient also history of COPD.  She had chest pain 

earlier this evening.  This was relieved with nitroglycerin.  Patient states 

she has had these nitroglycerin 3 times today.  Typically she has to use 

nitroglycerin about 3 times a week.  Has recently seen Dr. Myles who his 

advised no further interventional treatments for her.  She normally wears 3 L 

of oxygen but on EMS arrival the patient was not wearing it.  She had requested 

staff at the [facility] to call EMS because she wanted further evaluation for 

chest pain.  Currently she is pain free.  No recent fevers or chills.  No 

shortness of breath.  No nausea or vomiting.  No abdominal pain.





ROS:  10 systems were reviewed and were negative except those elements noted in 

the HPI.





PMH:  Coronary artery disease, COPD





Social History: No smoking, no alcohol,  no recreational drug use





Family History: non-contributory





Physical Exam:


Gen: Awake, Alert, No Distress


HEENT:  


     Nose: no rhinorrhea


     Eyes: PERRLA, EOMI


     Mouth: Moist mucosa 


Neck: Supple, no JVD


Chest: nontender, lungs clear to auscultation


Heart: S1, S2 normal, no murmur


Abd: Soft, non-tender, no guarding


Back: no CVA tenderness, no midline tenderness 


Ext: no edema, non-tender


Skin: no rash


Neuro: CN II-XII intact, Sensation grossly intact, Strength 5/5 in bilateral 

upper and lower extremities








- Personal History


Current Tetanus/Diphtheria Vaccine: Yes


Current Tetanus Diphtheria and Acellular Pertussis (TDAP): Yes


Tetanus Vaccine Date: >10 YRS





- Medical/Surgical History


Hx Asthma: No


Hx Chronic Respiratory Disease: No


Hx Diabetes: No


Hx Cardiac Disease: Yes


Hx Renal Disease: No


Hx Cirrhosis: No


Hx Alcoholism: No


Hx HIV/AIDS: No


Hx Splenectomy or Spleen Trauma: No


Other PMH: CAD, angioplasty '04, TIA '09, depression, hyperlipidemia, MI '85, 

stomach ulcer, lap cholle '04, total hysterectomy '94, appendectomy '60, benign 

lumpectomy L '67.  home o2, htn, dementia.  depressiono, PE.  peritonitis.  c7 

spinous process fx after fall 1/15.





- Social History


Smoking Status: Former smoker


Constitutional: 


 Initial Vital Signs











Temperature (C)  37.1 C   02/26/19 22:57


 


Heart Rate  80   02/26/19 22:57


 


Respiratory Rate  20   02/26/19 22:57


 


Blood Pressure  143/76 H  02/26/19 22:57


 


O2 Sat (%)  94   02/26/19 22:57








 











O2 Delivery Mode               Nasal Cannula


 


O2 (L/minute)                  2














Allergies/Adverse Reactions: 


 





Sulfa (Sulfonamide Antibiotics) Allergy (Verified 02/26/19 23:05)


 








Home Medications: 














 Medication  Instructions  Recorded


 


Acetaminophen [Tylenol 325mg (*)] 650 mg PO Q4HRS PRN 05/05/18


 


Albuterol [Proventil Inhaler HFA 2 puffs IH Q4HRS PRN 05/05/18





(*)]  


 


Atorvastatin Calcium [Lipitor 20 20 mg PO DAILY 05/05/18





mg (*)]  


 


Cholecalciferol Vit D3 [Vitamin D3 1,000 units PO DAILY 05/05/18





(*)]  


 


Clopidogrel Bisulfate [Plavix (*)] 75 mg PO DAILY 05/05/18


 


Cyanocobalamin (Vitamin B-12) 1,000 mcg PO DAILY 05/05/18





[B-12]  


 


DULoxetine [Cymbalta 60 MG (*)] 60 mg PO DAILY 05/05/18


 


Ferrous Sulfate [Ferrous Sulf 325 1 tab PO DAILY 05/05/18





MG (*)]  


 


Herbals/Supplements -Info Only 1 ea PO DAILY 05/05/18


 


Metoprolol Succinate Xr [Toprol Xl 25 mg PO DAILY 05/05/18





25 mg (*)]  


 


Mirabegron [Myrbetriq] 50 mg PO DAILY 05/05/18


 


Multivitamins [Multivitamin (*)] 1 each PO DAILY 05/05/18


 


Nitroglycerin [Nitrostat 0.4 mg 0.4 mg SL Q5M PRN #10 btl 05/05/18





(*)]  


 


Pantoprazole Sodium [Protonix 40mg 40 mg PO DAILY 05/05/18





(*)]  


 


Diclofenac Sodium 1% [Voltaren Gel 2 gm TP QID PRN 02/26/19





(*)]  


 


Hydrocortisone 2.5% 1 jose guadalupe TP DAILY PRN 02/26/19





[Hydrocortisone 2.5% cream (*)]  


 


Lisinopril [Zestril 2.5 mg (*)] 2.5 mg PO DAILY 02/26/19


 


Polyethylene Glycol 3350 [Miralax 17 gm PO DAILY PRN 02/26/19





17 gm (*)]  


 


traMADol [Ultram 50 mg (*)] 50 mg PO Q6HRS PRN 02/26/19


 


C/E/Zn/Cu/OM3/DHA/EPA/LUT/ZEAX 1 each PO DAILY 02/27/19





[Preservision Areds 2 Softgel]  


 


Donepezil HCl [Aricept] 10 mg PO HS 02/27/19


 


Isosorbide Dinitrate [Isosorbide 10 mg PO BIDNITRATE 02/27/19





Dinitrate 10 mg (*)]  


 


Melatonin [Melatonin 5 mg] 5 mg PO HS 02/27/19


 


Omega-3 Fatty Acids [Fish Oil 1000 1,000 mg PO DAILY 02/27/19





mg (*)]  














Medical Decision Making





- Diagnostics


EKG Interpretation: 





ECG time 11:09 p.m., sinus rhythm with a rate of 72, LVH, inferior Q-waves with 

mild ST depressions in the anterior lateral leads.  No acute changes.


ED Course/Re-evaluation: 





88-year-old with unstable angina.  Pain-free here.  Troponin is normal.  ECG is 

nonacute.  Discussed with the hospitalist.  Will admit to their service for 

further care.





- Data Points


Laboratory Results: 


 Laboratory Results





 02/26/19 23:00 





 02/26/19 23:00 








Medications Given: 


 





Amlodipine Besylate (Norvasc)  2.5 mg PO DAILY [facility]


   Stop: 08/26/19 09:29


   Last Admin: 02/27/19 10:15 Dose:  2.5 mg


Atorvastatin Calcium (Lipitor)  20 mg PO DAILY [facility]


   Stop: 08/26/19 09:29


   Last Admin: 02/27/19 10:15 Dose:  20 mg


Clopidogrel Bisulfate (Plavix)  75 mg PO DAILY [facility]


   Stop: 08/26/19 09:14


   Last Admin: 02/27/19 10:15 Dose:  75 mg


Donepezil HCl (Aricept)  10 mg PO HS [facility]


   Stop: 08/26/19 20:59


   Last Admin: 02/27/19 20:27 Dose:  10 mg


Duloxetine HCl (Cymbalta)  60 mg PO DAILY [facility]


   Stop: 08/26/19 11:44


   Last Admin: 02/27/19 13:20 Dose:  60 mg


Enoxaparin Sodium (Lovenox)  30 mg SC DAILY [facility]


   Stop: 08/26/19 08:59


   Last Admin: 02/27/19 10:15 Dose:  30 mg


Sodium Chloride (Ns)  1,000 mls @ 100 mls/hr IV CONT [facility]


   Stop: 08/26/19 12:29


   Last Admin: 02/27/19 20:30 Dose:  1,000 mls


Isosorbide Dinitrate (Isosorbide Dinitrate)  10 mg PO BIDNITRATE [facility]


   Stop: 08/26/19 14:59


   Last Admin: 02/27/19 16:53 Dose:  10 mg


Metoprolol Succinate (Toprol Xl)  25 mg PO DAILY [facility]


   Stop: 08/26/19 09:29


   Last Admin: 02/27/19 10:15 Dose:  25 mg


Miscellaneous Medication (Melatonin [Melatonin 5 Mg])  5 mg PO HS [facility]


   Stop: 08/26/19 20:59


   Last Admin: 02/27/19 20:29 Dose:  Not Given


Nitroglycerin (Nitrostat)  0.4 mg SL Q5M PRN


   PRN Reason: Chest Pain


   Stop: 08/26/19 00:02


   Last Admin: 02/28/19 02:10 Dose:  0.4 mg





Discontinued Medications





Aspirin (Aspirin)  81 mg PO ONCE ONE


   Stop: 02/27/19 09:15


   Last Admin: 02/27/19 10:15 Dose:  81 mg





Point of Care Test Results: 


 Chemistry











  02/26/19





  23:00


 


POC Troponin I  0.03 ng/mL ng/mL





   (0.00-0.08) 














Departure





- Departure


Disposition: [facility]


Clinical Impression: 


 Unstable angina





Condition: Fair

## 2019-02-27 LAB — PLATELET # BLD: 180 10^3/UL (ref 150–400)

## 2019-02-27 RX ADMIN — METOPROLOL SUCCINATE SCH MG: 25 TABLET, EXTENDED RELEASE ORAL at 10:15

## 2019-02-27 RX ADMIN — ISOSORBIDE DINITRATE SCH MG: 10 TABLET ORAL at 16:53

## 2019-02-27 RX ADMIN — ENOXAPARIN SODIUM SCH MG: 100 INJECTION SUBCUTANEOUS at 10:15

## 2019-02-27 RX ADMIN — SODIUM CHLORIDE SCH MLS: 900 INJECTION, SOLUTION INTRAVENOUS at 20:30

## 2019-02-27 RX ADMIN — ATORVASTATIN CALCIUM SCH MG: 20 TABLET, FILM COATED ORAL at 10:15

## 2019-02-27 RX ADMIN — CLOPIDOGREL BISULFATE SCH MG: 75 TABLET, FILM COATED ORAL at 10:15

## 2019-02-27 NOTE — CPEKG
Test Reason : OPEN

Blood Pressure : ***/*** mmHG

Vent. Rate : 072 BPM     Atrial Rate : 072 BPM

   P-R Int : 177 ms          QRS Dur : 087 ms

    QT Int : 400 ms       P-R-T Axes : 057 -43 -57 degrees

   QTc Int : 438 ms

 

Sinus rhythm

LVH with secondary repolarization abnormality

Inferior infarct, old

Anterior Q waves, possibly due to LVH

 

Confirmed by Royal Melgoza ([id number]) on 2/27/2019 2:35:03 AM

 

Referred By: Royal Melgoza           Confirmed By:Royal Melgoza

## 2019-02-27 NOTE — GCON
[f rep st]



                                                                    CONSULTATION





CARDIOLOGY CONSULT



DATE OF CONSULTATION:  02/27/2019



REFERRING PHYSICIAN:  Juan M Lutz



PRIMARY CARDIOLOGIST:  Dr. Bowen Myles



CHIEF COMPLAINT:  Chest pain.



HISTORY OF PRESENT ILLNESS:  We were asked by Dr. Mcgowan to visit with the patient.  The patient is a
n 88-year-old female with known coronary disease, status post PCI in the past with NSTEMI.  Most rece
nt intervention was to the left circumflex in 2015.  She does have 40% residual LAD disease, as well 
as high-grade distal RCA/PDA disease that was not previously intervened upon.  Other history, include
s hypertension and dementia. 



She has had a long history of angina.  She does see Dr. Myles in clinic and they have elected to man
age her symptoms medically.  The history is obtained from chart review, as well as patient interview.
  The patient is somewhat vague about details, but from what I can understand, she has been having in
creasing frequency of anginal episodes, mostly occurring at night, over the past several days.  She r
eports that this usually resolves with 1 or 2 sublingual nitroglycerin.  She denies dyspnea, palpitat
ions, presyncope, or syncope.  She came to the hospital last night because "she thought it should get
 checked out."  She reports "I don't want anything extreme."



REVIEW OF SYSTEMS:  Unable given the patient's dementia.



CODE STATUS:  Do not resuscitate.



ALLERGIES:  Sulfa.



PAST MEDICAL HISTORY:  

1.  Coronary disease as detailed above.

2.  Hypertension.

3.  Dementia.

4.  History of stroke.

5.  Depression.

6.  GERD.

7.  Chronic hypoxic respiratory failure, on continuous oxygen therapy.

8.  Macular degeneration.

9.  Osteoporosis.

10.  History of pulmonary emboli.

11.  Scoliosis.

12.  History of cervical vertebral fracture.

13.  Basal cell carcinoma.

14.  Arthritis.

15.  Anemia.  

16.  Dilated ascending aorta.

17.  Pulmonary hypertension.

18.  Valvular heart disease.



SURGICAL HISTORY:  Includes appendectomy, breast biopsy, cataract surgery, cholecystectomy, small-bow
el obstruction, total hysterectomy.



SOCIAL HISTORY:  The patient resides at the [facility].  She tells me that she does have nursing assist
ance with her medications.  She is a former tobacco user and does drink some alcohol.  Children live 
nearby.



FAMILY HISTORY:  Not applicable to the current case.



PHYSICAL EXAM:  VITAL SIGNS:  Blood pressure 149/72, heart rate 60s to 70s.  Oxygen saturation 97% on
 3 L nasal cannula.  She is afebrile.  GENERAL:  A somewhat frail, elderly female in no acute distres
s.  HEENT:  Sclerae free from jaundice.  Mucous membranes moist.  Normocephalic, atraumatic.  CARDIOV
ASCULAR:  JVP less than 10.  Carotids equal, 2+ bruit.  Regular rate and rhythm.  Soft, early systoli
c ejection murmur at the base.  No rub or gallop.  LUNGS:  Clear to auscultation without wheeze, rhon
chi, or rales.  ABDOMEN:  Soft, nontender, nondistended, without bruits, masses, or hepatosplenomegal
y.  EXTREMITIES:  Warm, well perfused without cyanosis, clubbing, or edema.  NEURO:  She is alert and
 oriented x2.  Slightly tangential in her speech pattern.  No gross focal neurological deficits.



LABORATORY/IMAGING:  White count 5.4, hematocrit 34.4, platelets are 180.  Basic metabolic panel norm
al, except for BUN of 42, creatinine 1.2.  Troponin 0.5 from a negative point-of-care troponin at 230
0 last night. 



EKG reviewed by me shows sinus rhythm with diffuse 1 mm ST depression and inferior T-wave inversion. 
 Ischemic changes are slightly more prominent compared with August 2018 EKG. 



Cath 2015, as detailed per HPI. 



Last echo in our office was February 2016:  Normal LV size and systolic function.  Mild mitral regurg
itation.  Moderate tricuspid regurgitation.  Estimated PA pressure is 69 mmHg.  Dilated ascending aor
ta 3.9 cm.



ASSESSMENT AND PLAN:  88-year-old female with known coronary disease, valvular heart disease, hyperte
nsion, dementia.  She was admitted with what appears to be worsening angina and minimally elevated tr
oponin.  She is currently chest pain-free.  I have reviewed outpatient notes and also discussed the c
ase with her primary cardiologist, Dr. Myles.  I have also discussed with the patient that we would 
recommend intensified medical management of coronary disease and angina and not at this time, proceed
 with coronary angiography given her age, frailty, and mild renal insufficiency.  The patient agrees 
with this. 



We will add low-dose aspirin to her Plavix.  We will increase her outpatient dose of Toprol to 25 mg 
daily and add Norvasc 2.5 mg daily.  Continue Imdur.  She does not appear to be in heart failure and 
I do not think she needs diuretics.  Continue statin. 



Would not recommend further testing at this time.  She will have an additional 1 to 2 troponins today
.  Certainly, if she has refractory chest pain and it becomes a quality of life issue, would need to 
have a discussion with the patient and her children about palliative care versus repeat angiogram wit
h consideration of PCI to improve symptoms.





Job #:  [id number]

## 2019-02-27 NOTE — PDGENHP
History and Physical





- Chief Complaint


Chest pain





- History of Present Illness


89 yo F w/ hx of CAD and dementia presents with chest pain. The patient has 

been having chest pain in increasing frequency the last few days. Today she had 

three episodes and took nitroglycerin twice. She describes the pain as left 

sided and associated with SOB and diaphoresis. The pain terminates with 

nitroglycerin. She has a history of chronic angina, which patient and Dr. Myles have decided to manage medically, but the increasing frequency of the 

pain has led her to seek some adjustment to her medical management. At the time 

of my evaluation she is chest pain free. Her evaluation in the ED has been 

unremarkable with negative troponin and ECG appears quite similar to prior.





Case discussed with ED physician Dr. Myles; records reviewed and summarized 

above.





History Information





- Allergies/Home Medication List


Allergies/Adverse Reactions: 








Sulfa (Sulfonamide Antibiotics) Allergy (Verified 19 23:05)


 





Home Medications: 








Acetaminophen [Tylenol 325mg (*)] 325 - 650 mg PO Q6HRS PRN 18 [Last 

Taken Unknown]


Albuterol [Proventil Inhaler HFA (*)] 1 puffs IH BID 18 [Last Taken 

Unknown]


Atorvastatin Calcium [Lipitor 20 mg (*)] 20 mg PO HS 18 [Last Taken 

Unknown]


Cholecalciferol Vit D3 [Vitamin D3 (*)] 1,000 units PO DAILY 18 [Last 

Taken Unknown]


Clopidogrel Bisulfate [Plavix (*)] 75 mg PO DAILY 18 [Last Taken Unknown]


Cyanocobalamin (Vitamin B-12) [B-12] 1,000 mcg PO DAILY 18 [Last Taken 

Unknown]


DULoxetine [Cymbalta 60 MG (*)] 60 mg PO DAILY 18 [Last Taken Unknown]


Donepezil HCl [Aricept] 10 mg PO DAILY 18 [Last Taken Unknown]


Ferrous Sulfate [Ferrous Sulf 325 MG (*)] 1 tab PO HS 18 [Last Taken 

Unknown]


Herbals/Supplements -Info Only 1 ea PO DAILY 18 [Last Taken Unknown]


Metoprolol Succinate Xr [Toprol Xl 25 mg (*)] 12.5 mg PO DAILY 18 [Last 

Taken Unknown]


Mirabegron [Myrbetriq] 50 mg PO DAILY 18 [Last Taken Unknown]


Multivitamins [Multivitamin (*)] 1 each PO DAILY 18 [Last Taken Unknown]


Pantoprazole Sodium [Protonix 40mg (*)] 40 mg PO HS 18 [Last Taken Unknown

]


Vit C/E/Zn/Coppr/Lutein/Zeaxan [Ocuvite Lutein-Zeaxanthin Cap] 1 cap PO BID  [Last Taken Unknown]


Colace  19 [Last Taken Unknown]


Hydrocortisone 2.5%  19 [Last Taken Unknown]


Isosorbide Mononitrate [Imdur 30 mg (*)] 10 mg PO BID 19 [Last Taken 

Unknown]


Lisinopril  19 [Last Taken Unknown]


Magnesium Citrate  19 [Last Taken Unknown]


Melatonin 5 mg  19 [Last Taken Unknown]


Metoprolol Succinate 25 mg 19 [Last Taken Unknown]


Miralax 17 gm (*)  19 [Last Taken Unknown]


Myrbetriq 50 mg 19 [Last Taken Unknown]


Tramadol HCl 50 mg 19 [Last Taken Unknown]


Voltaren Gel (*)  19 [Last Taken Unknown]





I have personally reviewed and updated: family history, medical history





- Past Medical History


Additional medical history: CAD with history MI and  and stents in  and 15

, HTN, HLD, O2 dependence, depression, dementia, history of PE, per tinnitus, 

C7 fracture after fall in 





- Surgical History


Reports: coronary stent


Additional surgical history: Bilateral cataract extraction with lens placement, 

appendectomy , lumpectomy in the breast benign Harman , cardiac cath with 

stenting.





- Family History


Additional family history: Mother  with history of CAD and MI.  Patient 

has multiple adult children who are all healthy per her report.





- Social History


Smoking Status: Former smoker


Additional social history: Patient resides independent living.  She has several 

children who live locally.  Cor status-per most form brought in by patient and 

confirmed by her is limited.  No CPR.  Intubation and other treatments are okay.





Review of Systems


Review of Systems: 





ROS: 10pt was reviewed & negative except for what was stated in HPI & below





Physical Exam


Physical Exam: 

















Temp Pulse Resp BP Pulse Ox


 


 37.1 C   66   20   128/65 H  95 


 


 19 22:57  19 00:20  19 00:20  19 00:20  19 00:20




















O2 (L/minute)                  2














Constitutional: no apparent distress, not in pain


Eyes: PERRL, EOMI


Ears, Nose, Mouth, Throat: moist mucous membranes, no oral mucosal ulcers


Cardiovascular: regular rate and rhythym, systolic murmur


Respiratory: no respiratory distress, clear to auscultation


Gastrointestinal: normoactive bowel sounds, soft, non-tender abdomen


Skin: warm, normal color


Musculoskeletal: full muscle strength, no muscle tenderness


Neurologic: CN II-XII Intact, other (A&Ox2)


Psychiatric: interacting appropriately, not anxious





Lab Data & Imaging Review





 19 23:00





 19 23:00














WBC  6.44 10^3/uL (3.80-9.50)   19  23:00    


 


RBC  3.14 10^6/uL (4.18-5.33)  L  19  23:00    


 


Hgb  11.3 g/dL (12.6-16.3)  L  19  23:00    


 


Hct  34.2 % (38.0-47.0)  L  19  23:00    


 


MCV  108.9 fL (81.5-99.8)  H  19  23:00    


 


MCH  36.0 pg (27.9-34.1)  H  19  23:00    


 


MCHC  33.0 g/dL (32.4-36.7)   19  23:00    


 


RDW  13.4 % (11.5-15.2)   19  23:00    


 


Plt Count  209 10^3/uL (150-400)   19  23:00    


 


MPV  12.3 fL (8.7-11.7)  H  19  23:00    


 


Neut % (Auto)  65.7 % (39.3-74.2)   19  23:00    


 


Lymph % (Auto)  20.0 % (15.0-45.0)   19  23:00    


 


Mono % (Auto)  11.5 % (4.5-13.0)   19  23:00    


 


Eos % (Auto)  2.2 % (0.6-7.6)   19  23:00    


 


Baso % (Auto)  0.3 % (0.3-1.7)   19  23:00    


 


Nucleat RBC Rel Count  0.0 % (0.0-0.2)   19  23:00    


 


Absolute Neuts (auto)  4.23 10^3/uL (1.70-6.50)   19  23:00    


 


Absolute Lymphs (auto)  1.29 10^3/uL (1.00-3.00)   19  23:00    


 


Absolute Monos (auto)  0.74 10^3/uL (0.30-0.80)   19  23:00    


 


Absolute Eos (auto)  0.14 10^3/uL (0.03-0.40)   19  23:00    


 


Absolute Basos (auto)  0.02 10^3/uL (0.02-0.10)   19  23:00    


 


Absolute Nucleated RBC  0.00 10^3/uL (0-0.01)   19  23:00    


 


Immature Gran %  0.3 % (0.0-1.1)   19  23:00    


 


Immature Gran #  0.02 10^3/uL (0.00-0.10)   19  23:00    


 


Sodium  140 mEq/L (135-145)   19  23:00    


 


Potassium  4.9 mEq/L (3.5-5.2)   19  23:00    


 


Chloride  104 mEq/L ()   19  23:00    


 


Carbon Dioxide  28 mEq/l (22-31)   19  23:00    


 


Anion Gap  8 mEq/L (6-14)   19  23:00    


 


BUN  37 mg/dL (7-23)  H  19  23:00    


 


Creatinine  1.3 mg/dL (0.6-1.0)  H  19  23:00    


 


Estimated GFR  39   19  23:00    


 


Glucose  100 mg/dL ()   19  23:00    


 


Calcium  10.0 mg/dL (8.5-10.4)   19  23:00    


 


POC Troponin I  0.03 ng/mL (0.00-0.08)   19  23:00    








Visualized and Interpreted EKG results: Yes


EKG Interpretation: Positive for: normal sinsus rhythm, Q waves (Anterior leads)





Assessment & Plan


Assessment: 








89 yo F w/ hx of CAD present with chest pain.








Plan: 


1. Chest pain - Possibly unstable angina noting increasing frequency of chronic 

chest pain. Her pain terminates reliably with nitroglycerin. She had 3 episodes 

of angina on the day of admission. Initial troponin negative and ECG (

personally reviewed/interpreted) similar to prior with anterior Q waves.


- Observe in PCU


- Monitor on telemetry


- Trend cardiac enzymes


- NTG PRN for chest pain


- Cardiology consultation placed for evaluation of current medical regimen


2. CAD - With hx of MI in the s and multiple stent placements. She is 

followed by Dr. Myles and they have decided to manage her symptoms medically. 

Her medical regimen currently includes atorvastatin, lisinopril, Plavix, and 

Toprol XL per Dr. Myles's last progress note in 2018.


- May benefit from long acting nitrates, CCB, or ranolazine


- Cardiology consulted as above


- Continue home medications pending reconciliation


3. CHRF - 2 L/min chronically due to hx of PE, COPD, and CAD. She is currently 

stable on the same.


- Continue O2 at baseline level


4. Dementia - Appears mild during my evaluation; she interacts appropriately 

and is A&Ox2.





Diet - Regular


Code - Full


Ppx - LMWH


Dispo - Admit under observation status

## 2019-02-27 NOTE — HOSPPROG
Hospitalist Progress Note


Assessment/Plan: 





89 yo F w cad here w NSTEMI





NSTEMI: ekg w more dramatic inferior twi and lateral twave flattening


   trop trending down


   amlodipine added





dementia: appears at baseline- alert, conversant, forgetful





code: dnr





christiano: cr 1.2m w normal baseline


   gentle IVF





dispo: change to inpatient


   


Subjective: case d/w dr infante.  no CP


Objective: 


 Vital Signs











Temp Pulse Resp BP Pulse Ox


 


 37.1 C   65   16   149/72 H  97 


 


 02/27/19 09:03  02/27/19 10:15  02/27/19 09:03  02/27/19 09:03  02/27/19 09:03








 Laboratory Results





 02/27/19 04:10 





 02/27/19 04:10 





 











 02/26/19 02/27/19 02/28/19





 05:59 05:59 05:59


 


Intake Total  100 


 


Output Total  200 


 


Balance  -100 














- Physical Exam


Constitutional: no apparent distress, appears nourished


Eyes: PERRL, anicteric sclera


Ears, Nose, Mouth, Throat: moist mucous membranes, hearing normal


Cardiovascular: regular rate and rhythym, no murmur, rub, or gallop


Respiratory: no respiratory distress, no rales or rhonchi


Gastrointestinal: normoactive bowel sounds, soft, non-tender abdomen


Genitourinary: No ferrari in urethra


Skin: warm, normal color


Musculoskeletal: full muscle strength


Neurologic: AAOx3





ICD10 Worksheet


Patient Problems: 


 Problems











Problem Status Onset


 


Unstable angina Acute  


 


C7 cervical fracture Acute  


 


Chest pain Acute  


 


History of coronary artery disease Acute

## 2019-02-28 ENCOUNTER — HOSPITAL ENCOUNTER (EMERGENCY)
Dept: HOSPITAL 80 - FED | Age: 84
Discharge: STILL ADMITTED OR ADMINISTRATIVE | End: 2019-02-28
Payer: COMMERCIAL

## 2019-02-28 VITALS — DIASTOLIC BLOOD PRESSURE: 64 MMHG | SYSTOLIC BLOOD PRESSURE: 130 MMHG

## 2019-02-28 VITALS — SYSTOLIC BLOOD PRESSURE: 135 MMHG | DIASTOLIC BLOOD PRESSURE: 61 MMHG

## 2019-02-28 DIAGNOSIS — E86.9: ICD-10-CM

## 2019-02-28 DIAGNOSIS — R07.9: Primary | ICD-10-CM

## 2019-02-28 DIAGNOSIS — I25.10: ICD-10-CM

## 2019-02-28 LAB — PLATELET # BLD: 187 10^3/UL (ref 150–400)

## 2019-02-28 RX ADMIN — NITROGLYCERIN PRN MG: 0.4 TABLET SUBLINGUAL at 01:55

## 2019-02-28 RX ADMIN — SODIUM CHLORIDE SCH MLS: 900 INJECTION, SOLUTION INTRAVENOUS at 06:19

## 2019-02-28 RX ADMIN — ATORVASTATIN CALCIUM SCH MG: 20 TABLET, FILM COATED ORAL at 09:46

## 2019-02-28 RX ADMIN — METOPROLOL SUCCINATE SCH MG: 25 TABLET, EXTENDED RELEASE ORAL at 09:48

## 2019-02-28 RX ADMIN — NITROGLYCERIN PRN MG: 0.4 TABLET SUBLINGUAL at 02:10

## 2019-02-28 RX ADMIN — ENOXAPARIN SODIUM SCH MG: 100 INJECTION SUBCUTANEOUS at 09:49

## 2019-02-28 RX ADMIN — ISOSORBIDE DINITRATE SCH MG: 10 TABLET ORAL at 06:17

## 2019-02-28 RX ADMIN — CLOPIDOGREL BISULFATE SCH MG: 75 TABLET, FILM COATED ORAL at 09:46

## 2019-02-28 NOTE — PDIAF
- Diagnosis


Diagnosis: NSTEMI


Code Status: Do Not Resuscitate





- Medication Management


Discharge Medications: electronically signed and located in the Home Medication 

List.





- Orders


Services needed: Home Care, Registered Nurse, Physical Therapy, Occupational 

Therapy


Home Care Face to Face: I certify that this patient was under my care and that 

I had the required face-to-face encounter meeting the encounter requirements on 

the discharge day.  My findings support the fact that the patient is homebound 

as defined in


Home Care Face to Face Continued: CMS Chapter 7 Medicare Benefits Manual 30.1.1

, The condition of the patient is such that there exists a normal inability to 

leave home and consequently, leaving home would require a considerable and 

taxing effort.


Isolation Type: None





- Follow Up Care


Current Providers and Referrals: 


Linda Jordan MD [Primary Care Provider] - As per Instructions

## 2019-02-28 NOTE — PDCARPN
Cardiology Progress Note


Assessment/Plan: 


Assessment/Plan:  88-year-old female with known coronary disease.  She has had 

multiple PCI and non STEMI in the past.  Most recent intervention was to the 

left circumflex in 2015. At that time she had distal RCA disease not amenable 

to intervention.  40% lad disease.  Other history includes hypertension, 

dementia, chronic hypoxic respiratory failure, chronic renal insufficiency.





She was admitted 02/26 with worsening nocturnal angina.  Troponins have been 

minimally elevated and decreasing.  EKG slightly worse than baseline.  Episode 

of chest pain last night received with nitroglycerin.  Troponins continued to 

trend down.  I cannot view the image of her EKG that was done during the night.





1. Angina with known coronary disease and small and STEMI.  In the past the 

patient has requested medical management of her coronary disease.  This is 

based on conversations with her primary cardiologist, Dr. Myles.  She confirms 

this again now.  Continue beta-blocker.  Low-dose Norvasc has been started.  

Will increase her oral long-acting nitrate, changing to Imdur once daily.  I 

have added Plavix to her aspirin.  Continue statin.





2. Hypertension:  Currently well controlled





3. Dementia:  Seems to be at baseline





4.  Chronic renal insufficiency:  Stable.  Avoid catheterization unless it 

becomes a quality of life issue with intractable angina.  











02/28/19 11:25





Subjective: 





She reports an episode of chest pain last night that responded to nitroglycerin 

sublingual.  She is currently chest pain-free.  She reports fatigue.


Reviewed/Discussed With: multidisciplinary team


Objective: 





 Vital Signs (8 Hrs)











  Temp Pulse Resp BP Pulse Ox


 


 02/28/19 09:48   67   


 


 02/28/19 08:00  36.8 C  59 L  20  131/66 H  99


 


 02/28/19 04:46  36.7 C  72  16  116/65  93








 Intake/Output (24 Hrs)











 02/27/19 02/28/19 03/01/19





 05:59 05:59 05:59


 


Intake Total  1500 


 


Output Total  400 


 


Balance  1100 


 


Intake:   


 


  Oral (ml)  550 


 


  IV Intake (ml)  950 


 


Output:   


 


  Urine (ml)  400 


 


    Toilet  400 


 


Other:   


 


  Number of Voids   


 


    Toilet  1 








No acute distress.  Sleeping but wakes easily.  Answers questions but is 

tangential


JVP less than 10. Regular rate and rhythm without murmur or gallop


Lungs clear bilaterally wheeze rhonchi rales


No lower extremity edema


Result Diagrams: 


 02/27/19 04:10





 02/28/19 04:20


Cardiac Labs: 





 Cardiac Lab Results (72 Hrs)











  02/28/19 02/28/19





  09:30 04:20


 


Troponin I  0.160 H  0.201 H











Telemetry: 





Normal sinus rhythm





ICD10 Worksheet


Patient Problems: 


 Problems











Problem Status Onset


 


Chronic Disease Mgmt/Transitional Care Acute  


 


C7 cervical fracture Acute  


 


History of coronary artery disease Acute  


 


Chest pain Acute  


 


Unstable angina Acute

## 2019-02-28 NOTE — PDMN
Medical Necessity


Medical necessity: Change to IP, as of 2/27/19, per MD & MCG M-230; los >2 mn 

for ongoing management of NSTEMI; requiring further monitoring, med management 

& therapies; comorbid advanced age, dementia

## 2019-02-28 NOTE — GDS
[f rep st]



                                                             DISCHARGE SUMMARY





DISCHARGE DIAGNOSES:  

1.  Non-ST elevation myocardial infarction.  

2.  Chest pain.  



Please see admission History and Physical by Dr. Jimmy Rojas.  The patient presented with ch
est pain.  She had some subtle EKG changes.  Troponin was 0.5.  She was not in heart failure.  In the
 past, she has been managed by Dr. Myles over the past and in the past has declined percutaneous int
ervention.  She continues to decline that.  Troponin trended down.  She had amlodipine and aspirin ad
ded back to Plavix monotherapy and her nitrate was increased.  She tolerated this well and is dischar
ged back to [facility].  Her daughter is active in her care.





Job #:  [id number]

## 2019-02-28 NOTE — CPEKG
Test Reason : OPEN

Blood Pressure : ***/*** mmHG

Vent. Rate : 071 BPM     Atrial Rate : 069 BPM

   P-R Int : 164 ms          QRS Dur : 086 ms

    QT Int : 430 ms       P-R-T Axes : 048 -43 -39 degrees

   QTc Int : 468 ms

 

Sinus rhythm

LVH with secondary repolarization abnormality

Inferior infarct, old

Anterior Q waves, possibly due to LVH

 

Confirmed by McCollester, Laughlin ([id number]) on 2/28/2019 10:39:39 PM

 

Referred By: Laughlin McCollester           Confirmed By:Laughlin McCollester

## 2019-02-28 NOTE — EDPHY
H & P


Time Seen by Provider: 02/28/19 20:24


HPI/ROS: 





HPI


Chest pain.





88-year-old female by ambulance from home.  This patient was just discharged 

today after being admitted to the hospital for a non ST elevation MI. She 

refused interventional management.  She has refused this in the past.  Her 

troponins have been trending down and she was chest pain-free.  She was 

discharged on amlodipine and Plavix.  About 45 min prior to arrival to the 

emergency department she developed substernal chest pain.  She took 1 

nitroglycerin.  She is now feeling better.  She denies chest pain on my 

evaluation.  EMS reports ST depressions in the lateral leads.





ROS:





Constitutional:  No fever, no chills.  No weakness.


Eyes:  No discharge.  No changes in vision.


ENT:  No sore throat.  No nasal congestion or rhinorrhea.


Respiratory:  No cough.  No shortness of breath.


Cardiac:  As above, no palpitations.


Gastrointestinal:  No abdominal pain, no vomiting, no diarrhea.


Genitourinary:  No hematuria.  No dysuria or increased frequency with urination.


Musculoskeletal:  No back pain.  No neck pain.  No myalgias or arthralgias.


Skin:  No rashes.


Neurological:  No headache.  No focal weakness or altered sensation.





Past medical history:  Coronary artery disease.  She is a DNR.





Social history:  She is here by herself.  She lives at [facility] 

Living [facility].  Nonsmoker.  No alcohol.





Physical Exam:





General Appearance:  Alert, no distress.  This patient is responding to 

questions appropriately and in full sentences.  This patient appears well-

hydrated and well-nourished.


Eyes:  Pupils equal and round no pallor or injection.  No lid edema, erythema 

or injection.


Respiratory:  There are no retractions, lungs are clear to auscultation with 

good air movement bilaterally.


Cardiovascular:  Regular rate and rhythm.  No murmur.


Gastrointestinal:  Abdomen is soft and nontender, no masses, bowel sounds 

normal.  No focal tenderness at McBurney's point.  No Santoro sign.


Neurological:  Motor sensory function is grossly intact.  Cranial nerves are 

normal.  Gait is normal.


Skin:  Warm and dry, no rashes.


Musculoskeletal:  Neck is supple and nontender.


Extremities are symmetrical.  All joints range without pain or impingement.


Psychiatric:  No agitation.  No depression.





Database:





EKG:





EKG time is 8:30 p.m.; EKG shows a narrow complex normal sinus rhythm with a 

ventricular rate of 71.  The OR, QRS, QT intervals are within normal limits.  T-

wave inversions noted in lead 3, AVF and V5.  No evidence of right heart 

strain.  Interpreted by me.





Imaging:





Chest x-ray AP portable; no acute cardiopulmonary disease process noted.  

Interpreted by me.





Procedures:





Emergency department course:





Triage vital signs reviewed.  IV was placed per EMS.  The patient is currently 

chest pain-free.  She was placed on a cardiac monitor.  EKG obtained and 

reviewed by myself.





9:30 p.m., the patient was re-evaluated, she was moved from room 2 to room 6.  

She is not having any chest pain.  I discussed the results of her diagnostic 

workup.  I discussed admission for observation overnight.  She is declining 

this.  She also told me that she does not want PCI.  In my professional opinion 

she understands the risks of declining admission given her disease.  She is a 

DNR.  She is requesting discharge back to [facility].  We are currently 

looking into transport.





10:10 p.m., the patient was re-evaluated, she is watching TV.  She has no 

complaints.  No chest pain.  No shortness of breath.  She states that she does 

want to be discharged back to her assisted living facility.  I feel this is 

reasonable.  We will arrange for transport.  Follow-up and return to emergency 

department precautions discussed with her.  All of her questions were answered.

  She was discharged from the emergency department in good condition.





Differential Diagnosis:





The differential diagnosis on this patient includes but is not limited to acute 

coronary since.  Pulmonary embolism, pericarditis, myocarditis, aortic 

dissection unlikely.  This represents a partial list of diagnoses considered.  

These considerations are based on history, physical exam, past history, 

reassessment and diagnostic testing.


Smoking Status: Former smoker


Constitutional: 


 Initial Vital Signs











Heart Rate  68   02/28/19 20:33


 


Respiratory Rate  18   02/28/19 20:33


 


Blood Pressure  131/80 H  02/28/19 20:33


 


O2 Sat (%)  96   02/28/19 20:33








 











O2 Delivery Mode               Nasal Cannula


 


O2 (L/minute)                  3














Allergies/Adverse Reactions: 


 





Sulfa (Sulfonamide Antibiotics) Allergy (Verified 02/28/19 20:32)


 








Home Medications: 














 Medication  Instructions  Recorded


 


Acetaminophen [Tylenol 325mg (*)] 650 mg PO Q4HRS PRN 05/05/18


 


Albuterol [Proventil Inhaler HFA 2 puffs IH Q4HRS PRN 05/05/18





(*)]  


 


Atorvastatin Calcium [Lipitor 20 20 mg PO DAILY 05/05/18





mg (*)]  


 


Cholecalciferol Vit D3 [Vitamin D3 1,000 units PO DAILY 05/05/18





(*)]  


 


Clopidogrel Bisulfate [Plavix (*)] 75 mg PO DAILY 05/05/18


 


Cyanocobalamin (Vitamin B-12) 1,000 mcg PO DAILY 05/05/18





[B-12]  


 


DULoxetine [Cymbalta 60 MG (*)] 60 mg PO DAILY 05/05/18


 


Ferrous Sulfate [Ferrous Sulf 325 1 tab PO DAILY 05/05/18





MG (*)]  


 


Herbals/Supplements -Info Only 1 ea PO DAILY 05/05/18


 


Metoprolol Succinate Xr [Toprol Xl 25 mg PO DAILY 05/05/18





25 mg (*)]  


 


Mirabegron [Myrbetriq] 50 mg PO DAILY 05/05/18


 


Multivitamins [Multivitamin (*)] 1 each PO DAILY 05/05/18


 


Nitroglycerin [Nitrostat 0.4 mg 0.4 mg SL Q5M PRN #10 btl 05/05/18





(*)]  


 


Pantoprazole Sodium [Protonix 40mg 40 mg PO DAILY 05/05/18





(*)]  


 


Diclofenac Sodium 1% [Voltaren Gel 2 gm TP QID PRN 02/26/19





(*)]  


 


Hydrocortisone 2.5% 1 jose guadalupe TP DAILY PRN 02/26/19





[Hydrocortisone 2.5% cream (*)]  


 


Lisinopril [Zestril 2.5 mg (*)] 2.5 mg PO DAILY 02/26/19


 


Polyethylene Glycol 3350 [Miralax 17 gm PO DAILY PRN 02/26/19





17 gm (*)]  


 


traMADol [Ultram 50 mg (*)] 50 mg PO Q6HRS PRN 02/26/19


 


C/E/Zn/Cu/OM3/DHA/EPA/LUT/ZEAX 1 each PO DAILY 02/27/19





[Preservision Areds 2 Softgel]  


 


Donepezil HCl [Aricept] 10 mg PO HS 02/27/19


 


Melatonin [Melatonin 5 mg] 5 mg PO HS 02/27/19


 


Omega-3 Fatty Acids [Fish Oil 1000 1,000 mg PO DAILY 02/27/19





mg (*)]  


 


Aspirin [Aspirin 81mg (*)] 81 mg PO DAILY #30 tab.chew 02/28/19


 


Isosorbide Mononitrate [Imdur 30 30 mg PO DAILY #30 tab.sr 02/28/19





mg (*)]  


 


amLODIPine BESYLATE [Norvasc 2.5 2.5 mg PO DAILY #30 tab 02/28/19





mg (*)]  














Medical Decision Making





- Data Points


Laboratory Results: 


 Laboratory Results





 02/28/19 20:30 





 02/28/19 20:30 








Medications Given: 


 








Discontinued Medications





Aspirin (Aspirin)  324 mg PO EDNOW ONE


   Stop: 02/28/19 20:26


   Last Admin: 02/28/19 20:39 Dose:  324 mg


Sodium Chloride (Ns)  500 mls @ 1,000 mls/hr IV EDNOW ONE


   PRN Reason: Protocol


   Stop: 02/28/19 20:54


   Last Admin: 02/28/19 20:39 Dose:  500 mls





Point of Care Test Results: 


 Chemistry











  02/28/19





  20:36


 


POC Troponin I  0.07 ng/mL ng/mL





   (0.00-0.08) 














Departure





- Departure


Disposition: [facility]


Clinical Impression: 


 Chest pain, History of coronary artery disease





Condition: Good


Instructions:  Angina (ED)


Additional Instructions: 


Read and follow provided instructions.





Follow-up with your cardiologist in 1-2 days for re-evaluation.





Take your medication as prescribed.





Return to the emergency department for return of chest pain, shortness of 

breath or other serious concerns.


Referrals: 


Ирина Wells MD [Medical Doctor] - As per Instructions

## 2019-02-28 NOTE — HOSPPROG
Hospitalist Progress Note


Assessment/Plan: 





87 yo F w cad here w NSTEMI





NSTEMI: ekg w more dramatic inferior twi and lateral twave flattening


   trop trending down


   amlodipine added, long acting nitrate increased





dementia: appears at baseline- alert, conversant, forgetful





code: dnr





christiano: cr 1.2m w normal baseline


   gentle IVF





dispo: back to [facility] today


   > 30 minutes


Subjective: cp overnight.  trop continues to trend down


Objective: 


 Vital Signs











Temp Pulse Resp BP Pulse Ox


 


 36.6 C   57 L  16   120/55 L  94 


 


 02/28/19 12:00  02/28/19 12:00  02/28/19 12:00  02/28/19 12:00  02/28/19 12:00








 Laboratory Results





 02/28/19 04:20 





 











 02/27/19 02/28/19 03/01/19





 05:59 05:59 05:59


 


Intake Total  1500 


 


Output Total  400 


 


Balance  1100 














- Physical Exam


Constitutional: no apparent distress, appears nourished


Eyes: PERRL, anicteric sclera


Ears, Nose, Mouth, Throat: moist mucous membranes, hearing normal


Cardiovascular: regular rate and rhythym, no murmur, rub, or gallop


Respiratory: no respiratory distress, no rales or rhonchi


Gastrointestinal: normoactive bowel sounds, soft, non-tender abdomen


Genitourinary: no bladder fullness, No ferrari in urethra


Skin: warm, normal color


Musculoskeletal: full muscle strength





ICD10 Worksheet


Patient Problems: 


 Problems











Problem Status Onset


 


Chronic Disease [facility]/Transitional Care Acute  


 


Unstable angina Acute  


 


C7 cervical fracture Acute  


 


Chest pain Acute  


 


History of coronary artery disease Acute

## 2019-03-01 NOTE — ASMTLACE
LACE

 

Length of stay for            Answers:  Less than 1 day                       

current admission                                                             

Acuity / Level of             Answers:  No                                    

Care: Did the patient                                                         

have an inpatient                                                             

admission?                                                                    

Comorbidities - select        Answers:  Chronic pulmonary disease             

all that apply                                                                

                                        Coronary Artery Disease               

                                        Dementia                              

                                        Previous myocardial                   

                                        infarction                            

                                        Other                         Notes:  HTN; HLD; Hx of PE

# of Emergency department     Answers:  3-4                                   

visits in the last 6                                                          

months                                                                        

Social determinants           Answers:  Mental health diagnosis               

                                        (anxiety, depression, pers            

                                        onality disorders, etc.)              

Score: 15

 

Date Signed:  03/01/2019 05:10 PM

Electronically Signed By:Abbey Rand RN

## 2019-03-01 NOTE — ASMTDCNOTE
Case Management Discharge

 

Discharge Order Complete?     Answers:  Yes                                   

Patient to Obtain             Answers:  Other                         Notes:  [facility]

Medications                                                                   

Faxed Final Orders            Answers:  Yes                           Notes:  to the [facility]

Agency/Facility Transfer      Answers:  Yes                           Notes:  to the [facility]

Report Printed & Faxed to                                                     

Receiving Agency                                                              

Discharge Comments            

Notes:

3/1/2018 Case Management Note



Pt discharged on 2/28 to the [facility]. Faxed final orders via [employer].  Left VM for TIFFANIE Tsang at 

the [facility] twice today 3/1 as well as yesterday requesting call back.  [facility] uses [facility] for 

unskilled and RN support.  PT recommended home care.  Attempted to confirm with Trin[facility] that [facility] can provide PT.



Phone call to daughter Deanna [phone] on 3/1.  Agreed to referral to [facility].  Faxed 

via [employer].  Spoke with Lindsey from [facility] who accepted pt and will start care on Monday.



Case Management d/c poc:  return to the [facility] resuming [facility] with the addition of 

[facility].

 

Date Signed:  03/01/2019 05:12 PM

Electronically Signed By:Abbey Rand RN

## 2019-03-01 NOTE — ASDISCHSUM
----------------------------------------------

Discharge Information

----------------------------------------------

Plan Status:Home with Home Health                    Medically Cleared to Leave:02/27/2019

Discharge Date:02/28/2019 02:56 PM                    D/C Disposition:Home Health Service

[facility] D/C Disposition:Home, Routine, Self-Care         Projected Discharge Date:03/01/2019 11:00 AM

Transportation at D/C:Family                         Discharge Delay Reason:

Follow-Up Date:03/01/2019 11:00 AM                   Discharge Slot:

Final Diagnosis:

----------------------------------------------

Placement Information

----------------------------------------------

Referral Type:Assisted Living Residence              Referral ID:[id number]

Provider Name:[facility]

Address 1:[address]                                Phone Number:

Address 2:                                           Fax Number:

City:[place]                                         Selection Factors:

State:[place]

 

Referral Type:*Home Health Care Services             Referral ID:[id number]

Provider Name[facility]

Address 1:[address]               Phone [phone]

Address 2:                                           Fax [phone]

City:[place]                                      Selection Factors:

State:[place]

 

----------------------------------------------

Patient Contact Information

----------------------------------------------

Contact Name:SVETLANA                               Relationship:Daughter

Address:[address]                                    Home [phone]

                                                     Work [phone]

City:[place]                                         Alternate Phone:

State/Zip Code:[place] [address]                              Email:

----------------------------------------------

Financial Information

----------------------------------------------

Financial Class:Medicare

Primary Plan Desc:MEDICARE INPATIENT                 Primary Plan Number:[id number]

Secondary Plan Desc: FOR LIFE                 Secondary Plan Number:[id number]

 

 

----------------------------------------------

Assessment Information

----------------------------------------------

----------------------------------------------

LACE

----------------------------------------------

LACE

 

Length of stay for            Answers:  Less than 1 day                       

current admission                                                             

Acuity / Level of             Answers:  No                                    

Care: Did the patient                                                         

have an inpatient                                                             

admission?                                                                    

Comorbidities - select        Answers:  Chronic pulmonary disease             

all that apply                                                                

                                        Coronary Artery Disease               

                                        Dementia                              

                                        Previous myocardial                   

                                        infarction                            

                                        Other                         Notes:  HTN; HLD; Hx of PE

# of Emergency department     Answers:  3-4                                   

visits in the last 6                                                          

months                                                                        

Social determinants           Answers:  Mental health diagnosis               

                                        (anxiety, depression, pers            

                                        onality disorders, etc.)              

Score: 15

 

Date Signed:  03/01/2019 05:10 PM

Electronically Signed By:Abbey Rand, RN

 

 

----------------------------------------------

[facility] CM Progress Note

----------------------------------------------

CM Note

 

CM Note                       

Notes:

Pt is a 87yo F who presents with unstable angina. Pt has history of dementia. Pt lives at The 

[facility].  CM spoke with Trinh the RN at the [facility] and requested 

updates/ information about discharge. She requested discharge ppwk be faxed to: [phone] and 

requested that Med orders be signed. 

CM requested advanced directives they have on file to be faxed to [facility]. 

PT/OT evals pending, CM to follow. 

 

Plan: TBD

 

Date Signed:  02/27/2019 02:06 PM

Electronically Signed By:YUSUF Avila

 

 

----------------------------------------------

Case Management Discharge Plan Note

----------------------------------------------

Case Management Discharge

 

Discharge Order Complete?     Answers:  Yes                                   

Patient to Obtain             Answers:  Other                         Notes:  [facility]

Medications                                                                   

Faxed Final Orders            Answers:  Yes                           Notes:  to the [facility]

Agency/Facility Transfer      Answers:  Yes                           Notes:  to the [facility]

Report Printed & Faxed to                                                     

Receiving Agency                                                              

Discharge Comments            

Notes:

3/1/2018 Case Management Note



Pt discharged on 2/28 to the [facility]. Faxed final orders via [employer].  Left VM for TIFFANIE Tsang at 

the [facility] twice today 3/1 as well as yesterday requesting call back.  [facility] 

[facility]skilled and RN support.  PT recommended home care.  Attempted to confirm with [facility]h that [facility] PT.



Phone call to daughter Deanna [phone] on 3/1.  Agreed to referral to [facility] home [facility].  Faxed 

via [employer].  Spoke with Lindsey from [facility] who accepted pt and will start care on Monday.



Case Management d/c poc:  return to the [facility] with the addition of 

[facility] home care.

 

Date Signed:  03/01/2019 05:12 PM

Electronically Signed By:Abbey Rand RN

 

 

----------------------------------------------

Intervention Information

----------------------------------------------

Intervention Type:*FORD-Signed                       Date of Service:02/27/2019 12:00 PM

Patient Type:Observation                             Staff Member:Aisha Delgado

Hours:                                               Discipline:

Severity:                                            Comment:

Intervention Type:*Sophie 72                      Date of Service:02/26/2019 08:37 AM

Patient Type:Inpatient                               Staff Member:TIFFANIE Mcgoawn, Cheri

Hours:                                               Discipline:

Severity:                                            Comment:

## 2019-03-04 NOTE — ASMTCMCOM
CM Note

 

CM Note                       

Notes:

3/4/2018 Case Management Note



Maryjo Boogie from the hospitalist group requested new fax to the [facility] staff with discharge 

paperwork.  Faxed to [phone].  Previous d/c paperwork faxed via [employer].  Multiple VM to 

Trinh at the [facility] were not returned to case management from Thur and Fri.  Home Care set up 

with [facility].

 

Date Signed:  03/04/2019 12:15 PM

Electronically Signed By:Abbey Rand RN

## 2019-03-04 NOTE — PQFORM
PHYSICIAN QUERY FORM

 

 Needs Your Response





This query form is being sent to you to assure this patient record is coded 
properly.  Please respond to the question below:



 QUESTION:



Dear Dr. Mcgowan,

   In reviewing this patients medical record, it is noted patient held the 
diagnosis of acute kidney injury.  Labs on 2/26; creatinine=1.3, BUN=37, 

2/27; creatinine=1.2, BUN=42, 2/28; BUN=30.  Patient was treated with "[facility] 
[facility]."  Noted in the 2/28 Cardiology progress note patient was noted to have 
"Chronic renal insufficiency."  After study, can the disorder of the kidneys be 
better specified?



____Acute kidney insufficiency



____Acute kidney injury



____Chronic kidney insufficiency



____Acute on chronic kidney insufficiency



____Other more appropriate diagnosis (please specify)___________________________



___X_Clinically unable to determine



Thank you

DOM Oneill

HIM/Coding Dept.



INSTRUCTIONS FOR RESPONSE:



Answer question by clicking on the "Edit Document" button.

Move cursor to area below the stars.

When complete, hit "Save."

Click on the "Sign" button, then click "Sign" again.

Type in your PIN and hit "Enter."



****************

MTDD

## 2019-03-06 NOTE — CPEKG
Test Reason : OPEN

Blood Pressure : ***/*** mmHG

Vent. Rate : 058 BPM     Atrial Rate : 058 BPM

   P-R Int : 196 ms          QRS Dur : 090 ms

    QT Int : 458 ms       P-R-T Axes : 061 -38 -69 degrees

   QTc Int : 450 ms

 

Sinus rhythm

Left axis deviation

Posterior infarct, old

Abnormal T, consider ischemia, inferior leads

 

Confirmed by Toby Peace ([id number]) on 3/6/2019 11:09:29 AM

 

Referred By: Jimmy Rojas           Confirmed By:Toby Peace

## 2019-03-14 ENCOUNTER — HOSPITAL ENCOUNTER (OUTPATIENT)
Dept: HOSPITAL 80 - BHFA | Age: 84
End: 2019-03-14
Attending: INTERNAL MEDICINE
Payer: COMMERCIAL

## 2019-03-14 DIAGNOSIS — I10: ICD-10-CM

## 2019-03-14 DIAGNOSIS — I25.10: Primary | ICD-10-CM

## 2022-11-02 NOTE — ASMTCMCOM
CM Note

 

CM Note                       

Notes:

Pt is a 87yo F who presents with unstable angina. Pt has history of dementia. Pt lives at The 

[facility].  CM spoke with Trinh the RN at the [facility] and requested 

updates/ information about discharge. She requested discharge ppwk be faxed to: [phone] and 

requested that Med orders be signed. 

CM requested advanced directives they have on file to be faxed to [facility]. 

PT/OT evals pending, CM to follow. 

 

Plan: TBD

 

Date Signed:  02/27/2019 02:06 PM

Electronically Signed By:YUSUF Avila Secondary Intention Text (Leave Blank If You Do Not Want): The defect will heal with secondary intention.